# Patient Record
Sex: MALE | Race: WHITE | NOT HISPANIC OR LATINO
[De-identification: names, ages, dates, MRNs, and addresses within clinical notes are randomized per-mention and may not be internally consistent; named-entity substitution may affect disease eponyms.]

---

## 2019-06-17 ENCOUNTER — APPOINTMENT (OUTPATIENT)
Dept: UROLOGY | Facility: CLINIC | Age: 69
End: 2019-06-17
Payer: MEDICARE

## 2019-06-17 VITALS
DIASTOLIC BLOOD PRESSURE: 83 MMHG | WEIGHT: 185 LBS | SYSTOLIC BLOOD PRESSURE: 185 MMHG | BODY MASS INDEX: 27.4 KG/M2 | HEIGHT: 69 IN | TEMPERATURE: 98.5 F

## 2019-06-17 DIAGNOSIS — Z80.1 FAMILY HISTORY OF MALIGNANT NEOPLASM OF TRACHEA, BRONCHUS AND LUNG: ICD-10-CM

## 2019-06-17 DIAGNOSIS — Z00.00 ENCOUNTER FOR GENERAL ADULT MEDICAL EXAMINATION W/OUT ABNORMAL FINDINGS: ICD-10-CM

## 2019-06-17 PROCEDURE — 99204 OFFICE O/P NEW MOD 45 MIN: CPT

## 2019-06-17 NOTE — REVIEW OF SYSTEMS
[Chest Pain] : chest pain [Shortness Of Breath] : shortness of breath [Poor quality erections] : Poor quality erections [Told you have blood in urine on a urine test] : told blood was present in a urine test [Wake up at night to urinate  How many times?  ___] : wakes up to urinate [unfilled] times during the night [Slow urine stream] : slow urine stream [Leakage of urine with urgency] : leakage of urine with urgency [Joint Swelling] : joint swelling [Anxiety] : anxiety [Depression] : depression [Negative] : Heme/Lymph

## 2019-06-18 ENCOUNTER — CLINICAL ADVICE (OUTPATIENT)
Age: 69
End: 2019-06-18

## 2019-06-18 NOTE — LETTER BODY
[FreeTextEntry1] : Rick Pearson MD\par [address] [place] [address] [address]\[place] [address]\par \par Dear Dr. Pearson,\par \par Reason for Visit: Elevated PSA.\par \par This is a 69 year-old recently retired gentleman with elevated PSA. Patient reports that he has not had previous prostate biopsy. Patient recently obtained blood test from PCP demonstrated abnormal PSA. Patient denies any hematuria or lower urinary tract symptoms. He denies any pain.The patient denies any aggravating or relieving factors. The patient denies any interference of function. The patient is entirely asymptomatic. All other review of systems are negative. He has family history of cervical and lung cancer. He has no previous surgical history. Past medical history, family history and social history were inquired and were noncontributory to current condition. The patient does not use tobacco or drink alcohol. Medications and allergies were reviewed. He has no known allergies to medication. \par \par On examination, the patient is a healthy-appearing gentleman in no acute distress. He is alert and oriented follows commands. He has normal mood and affect. He is normocephalic. Neck is supple. Oral no thrush. Respirations are unlabored. His abdomen is soft and nontender. Bladder is nonpalpable. No CVA tenderness. Neurologically he is grossly intact. No peripheral edema. Skin without gross abnormality. He has normal male external genitalia. Normal meatus. Bilateral testes are descended intrascrotally and normal to palpation. On rectal examination, there is normal sphincter tone. The prostate is clinically benign without focal induration or nodularity.\par \par Assessment: Elevated PSA.\par \par I discussed with him the risk of occult malignancy. He will repeat PSA and obtain BMP, urinalysis and culture today. Patient currently declines prostate biopsy. He is concerned about the risk following prostate biopsy. Risks and alternatives were discussed. I answered the patient questions. The patient will follow-up as directed and will contact me with any questions or concerns. Thank you for the opportunity to participate in the care of Mr. TADEO. I will keep you updated on his progress.\par \par Plan: Repeat PSA. BMP. Urinalysis. Culture. Follow up as directed.\par \par PSA 4.3. Prostate biopsy

## 2019-06-18 NOTE — ADDENDUM
[FreeTextEntry1] : Entered by Leslie Moore, acting as scribe for Dr. Gilbert Cerda.\par \par The documentation recorded by the scribe accurately reflects the service I personally performed and the decisions made by me.

## 2019-06-19 LAB
ANION GAP SERPL CALC-SCNC: 17 MMOL/L
APPEARANCE: CLEAR
BACTERIA UR CULT: NORMAL
BACTERIA: NEGATIVE
BILIRUBIN URINE: NEGATIVE
BLOOD URINE: NEGATIVE
BUN SERPL-MCNC: 19 MG/DL
CALCIUM SERPL-MCNC: 9.6 MG/DL
CHLORIDE SERPL-SCNC: 106 MMOL/L
CO2 SERPL-SCNC: 22 MMOL/L
COLOR: NORMAL
CREAT SERPL-MCNC: 0.85 MG/DL
GLUCOSE QUALITATIVE U: NEGATIVE
GLUCOSE SERPL-MCNC: 86 MG/DL
HYALINE CASTS: 0 /LPF
KETONES URINE: NEGATIVE
LEUKOCYTE ESTERASE URINE: NEGATIVE
MICROSCOPIC-UA: NORMAL
NITRITE URINE: NEGATIVE
PH URINE: 7
POTASSIUM SERPL-SCNC: 5 MMOL/L
PROTEIN URINE: NEGATIVE
PSA FREE FLD-MCNC: 15 %
PSA FREE SERPL-MCNC: 0.64 NG/ML
PSA SERPL-MCNC: 4.3 NG/ML
RED BLOOD CELLS URINE: 3 /HPF
SODIUM SERPL-SCNC: 145 MMOL/L
SPECIFIC GRAVITY URINE: 1.02
SQUAMOUS EPITHELIAL CELLS: 0 /HPF
UROBILINOGEN URINE: NORMAL
WHITE BLOOD CELLS URINE: 0 /HPF

## 2019-06-24 ENCOUNTER — NON-APPOINTMENT (OUTPATIENT)
Age: 69
End: 2019-06-24

## 2019-06-24 ENCOUNTER — APPOINTMENT (OUTPATIENT)
Dept: CARDIOLOGY | Facility: CLINIC | Age: 69
End: 2019-06-24
Payer: MEDICARE

## 2019-06-24 VITALS
WEIGHT: 181 LBS | HEART RATE: 66 BPM | BODY MASS INDEX: 26.81 KG/M2 | SYSTOLIC BLOOD PRESSURE: 165 MMHG | HEIGHT: 69 IN | RESPIRATION RATE: 15 BRPM | DIASTOLIC BLOOD PRESSURE: 95 MMHG

## 2019-06-24 DIAGNOSIS — Z86.59 PERSONAL HISTORY OF OTHER MENTAL AND BEHAVIORAL DISORDERS: ICD-10-CM

## 2019-06-24 PROCEDURE — 93000 ELECTROCARDIOGRAM COMPLETE: CPT

## 2019-06-24 PROCEDURE — 99204 OFFICE O/P NEW MOD 45 MIN: CPT

## 2019-06-24 NOTE — DISCUSSION/SUMMARY
[FreeTextEntry1] : This is a 69-year-old male with past medical history significant for anxiety, hypertension, status post bilateral hernia repair, status post bilateral varicose vein surgery, who comes in for cardiac consultation.  The patient reports his blood pressure has been elevated and unable to be controlled.  He attributes part of this to a anxiety component.\par He denies chest pain, shortness breath, dizziness or syncope.  He has no history of rheumatic fever.  He does not drink excessive caffeine or alcohol.  He has no known allergies.\par Electrocardiogram done June 24, 2019 demonstrates normal sinus rhythm rate 63 beats per minute and is otherwise remarkable for left axis deviation and evidence for left ventricular hypertrophy.\par The patient will schedule echo Doppler examination to evaluate his murmur, left ventricular function, chamber size, and rule out hypertrophy.\par He will follow up with me one month to recheck his blood pressure.  Lifestyle modification including salt restriction, regular aerobic activity, will reinforce.  He is currently taking Bystolic 20mg at midnight.  I have asked him to cut this medication in half, and take Micardis hydrochlorothiazide 80/25 mg in the morning and continue his 10 mg dose of Bystolic at midnight.\par The patient understands that aerobic exercises must be increased to 40 minutes 4 times per week. A detailed discussion of lifestyle modification was done today. The patient has a good understanding of the diagnosis, and treatment plan. Lifestyle modification was also outlined.

## 2019-06-24 NOTE — PHYSICAL EXAM
[General Appearance - Well Developed] : well developed [Normal Appearance] : normal appearance [Well Groomed] : well groomed [General Appearance - Well Nourished] : well nourished [No Deformities] : no deformities [General Appearance - In No Acute Distress] : no acute distress [Normal Conjunctiva] : the conjunctiva exhibited no abnormalities [Normal Oral Mucosa] : normal oral mucosa [Normal Jugular Venous A Waves Present] : normal jugular venous A waves present [Normal Jugular Venous V Waves Present] : normal jugular venous V waves present [No Jugular Venous Farris A Waves] : no jugular venous farris A waves [5th Left ICS - MCL] : palpated at the 5th LICS in the midclavicular line [Normal] : normal [No Precordial Heave] : no precordial heave was noted [Normal Rate] : normal [Rhythm Regular] : regular [Normal S1] : normal S1 [Normal S2] : normal S2 [No Gallop] : no gallop heard [2+] : left 2+ [No Abnormalities] : the abdominal aorta was not enlarged and no bruit was heard [No Pitting Edema] : no pitting edema present [Respiration, Rhythm And Depth] : normal respiratory rhythm and effort [Bowel Sounds] : normal bowel sounds [Auscultation Breath Sounds / Voice Sounds] : lungs were clear to auscultation bilaterally [Exaggerated Use Of Accessory Muscles For Inspiration] : no accessory muscle use [Gait - Sufficient For Exercise Testing] : the gait was sufficient for exercise testing [Abnormal Walk] : normal gait [Abdomen Soft] : soft [Nail Clubbing] : no clubbing of the fingernails [Skin Color & Pigmentation] : normal skin color and pigmentation [Cyanosis, Localized] : no localized cyanosis [Skin Turgor] : normal skin turgor [] : no rash [Oriented To Time, Place, And Person] : oriented to person, place, and time [Affect] : the affect was normal [No Anxiety] : not feeling anxious [Mood] : the mood was normal [II] : a grade 2 [S3] : no S3 [S4] : no S4 [Left Carotid Bruit] : no bruit heard over the left carotid [Right Carotid Bruit] : no bruit heard over the right carotid [Right Femoral Bruit] : no bruit heard over the right femoral artery [Left Femoral Bruit] : no bruit heard over the left femoral artery

## 2019-06-24 NOTE — REVIEW OF SYSTEMS
[Negative] : Heme/Lymph [Confusion] : no confusion was observed [Depression] : no depression [Memory Lapses Or Loss] : no memory lapses or loss [Anxiety] : anxiety [Suicidal] : not suicidal

## 2019-06-26 RX ORDER — ALPRAZOLAM 0.25 MG/1
0.25 TABLET ORAL
Refills: 0 | Status: DISCONTINUED | COMMUNITY
End: 2019-06-26

## 2019-06-27 ENCOUNTER — APPOINTMENT (OUTPATIENT)
Dept: UROLOGY | Facility: CLINIC | Age: 69
End: 2019-06-27
Payer: MEDICARE

## 2019-06-27 DIAGNOSIS — R97.20 ELEVATED PROSTATE, SPECIFIC ANTIGEN [PSA]: ICD-10-CM

## 2019-06-27 PROCEDURE — 99214 OFFICE O/P EST MOD 30 MIN: CPT

## 2019-06-27 NOTE — LETTER BODY
[FreeTextEntry1] : Rick Pearson MD\par [address] [place] [address] [address]\[place] [address]\par \par Dear Dr. Pearson,\par \par Reason for Visit: Elevated PSA.\par \par This is a 69 year-old recently retired gentleman with elevated PSA. Patient reports that he has not had previous prostate biopsy. Patient recently obtained blood test from PCP demonstrated abnormal PSA. Patient returns today for follow up. Since his last visit, his PSA remains elevated. Patient denies any hematuria or lower urinary tract symptoms. He denies any pain.The patient denies any aggravating or relieving factors. The patient denies any interference of function. The patient is entirely asymptomatic. All other review of systems are negative. He has family history of cervical and lung cancer. He has no previous surgical history. Past medical history, family history and social history were inquired and were noncontributory to current condition. The patient does not use tobacco or drink alcohol. Medications and allergies were reviewed. He has no known allergies to medication. \par \par On examination, the patient is a healthy-appearing gentleman in no acute distress. He is alert and oriented follows commands. He has normal mood and affect. He is normocephalic. Neck is supple. Oral no thrush. Respirations are unlabored. His abdomen is soft and nontender. Bladder is nonpalpable. No CVA tenderness. Neurologically he is grossly intact. No peripheral edema. Skin without gross abnormality. He has normal male external genitalia. Normal meatus. Bilateral testes are descended intrascrotally and normal to palpation. On rectal examination, there is normal sphincter tone. The prostate is clinically benign without focal induration or nodularity.\par \par His BMP demonstrated normal renal functions, creatinine 0.85. His PSA was 4.3, which is elevated. His urinalysis was unremarkable. His urine culture was negative. \par \par Assessment: Elevated PSA.\par \par I discussed with him the risk of occult malignancy. His PSA remains elevated. He will obtain prostate biopsy to further evaluate.  I counseled the patient regarding the procedure. The risks and benefits were discussed. Alternatives were given. I answered the patient questions. The patient will take the necessary preparations for the procedure. The patient will follow-up as directed and will contact me with any questions or concerns. Thank you for the opportunity to participate in the care of Mr. TADEO. I will keep you updated on his progress.\par \par Plan: Prostate biopsy. Follow up as directed.

## 2019-07-01 ENCOUNTER — APPOINTMENT (OUTPATIENT)
Dept: UROLOGY | Facility: CLINIC | Age: 69
End: 2019-07-01

## 2019-08-07 ENCOUNTER — APPOINTMENT (OUTPATIENT)
Dept: CARDIOLOGY | Facility: CLINIC | Age: 69
End: 2019-08-07
Payer: MEDICARE

## 2019-08-07 VITALS
SYSTOLIC BLOOD PRESSURE: 127 MMHG | RESPIRATION RATE: 16 BRPM | WEIGHT: 180 LBS | DIASTOLIC BLOOD PRESSURE: 80 MMHG | HEIGHT: 69 IN | BODY MASS INDEX: 26.66 KG/M2 | HEART RATE: 66 BPM

## 2019-08-07 PROCEDURE — 93306 TTE W/DOPPLER COMPLETE: CPT

## 2019-08-07 PROCEDURE — 99213 OFFICE O/P EST LOW 20 MIN: CPT | Mod: 25

## 2019-08-07 PROCEDURE — 93015 CV STRESS TEST SUPVJ I&R: CPT

## 2019-08-12 RX ORDER — NEBIVOLOL HYDROCHLORIDE 20 MG/1
20 TABLET ORAL
Qty: 90 | Refills: 1 | Status: DISCONTINUED | COMMUNITY
End: 2019-08-12

## 2019-09-10 ENCOUNTER — NON-APPOINTMENT (OUTPATIENT)
Age: 69
End: 2019-09-10

## 2019-09-10 ENCOUNTER — APPOINTMENT (OUTPATIENT)
Dept: CARDIOLOGY | Facility: CLINIC | Age: 69
End: 2019-09-10
Payer: MEDICARE

## 2019-09-10 VITALS
RESPIRATION RATE: 15 BRPM | WEIGHT: 183 LBS | BODY MASS INDEX: 27.11 KG/M2 | HEART RATE: 70 BPM | DIASTOLIC BLOOD PRESSURE: 80 MMHG | HEIGHT: 69 IN | SYSTOLIC BLOOD PRESSURE: 138 MMHG

## 2019-09-10 PROCEDURE — 99213 OFFICE O/P EST LOW 20 MIN: CPT

## 2019-09-10 PROCEDURE — 93000 ELECTROCARDIOGRAM COMPLETE: CPT

## 2019-09-11 RX ORDER — CEFDINIR 300 MG/1
300 CAPSULE ORAL
Qty: 6 | Refills: 0 | Status: COMPLETED | COMMUNITY
Start: 2019-06-18 | End: 2019-09-11

## 2019-09-23 RX ORDER — LABETALOL HYDROCHLORIDE 200 MG/1
200 TABLET, FILM COATED ORAL
Qty: 180 | Refills: 1 | Status: DISCONTINUED | COMMUNITY
Start: 2019-08-12 | End: 2019-09-23

## 2019-10-01 ENCOUNTER — APPOINTMENT (OUTPATIENT)
Dept: CARDIOLOGY | Facility: CLINIC | Age: 69
End: 2019-10-01
Payer: MEDICARE

## 2019-10-01 VITALS — HEIGHT: 69 IN | WEIGHT: 183 LBS | BODY MASS INDEX: 27.11 KG/M2

## 2019-10-01 VITALS
HEART RATE: 70 BPM | HEIGHT: 69 IN | SYSTOLIC BLOOD PRESSURE: 132 MMHG | WEIGHT: 183 LBS | BODY MASS INDEX: 27.11 KG/M2 | DIASTOLIC BLOOD PRESSURE: 84 MMHG | RESPIRATION RATE: 16 BRPM

## 2019-10-01 PROCEDURE — 99213 OFFICE O/P EST LOW 20 MIN: CPT

## 2019-10-04 RX ORDER — ENEMA 19; 7 G/133ML; G/133ML
7-19 ENEMA RECTAL
Qty: 1 | Refills: 0 | Status: COMPLETED | COMMUNITY
Start: 2019-06-18 | End: 2019-10-04

## 2019-11-18 ENCOUNTER — APPOINTMENT (OUTPATIENT)
Dept: CARDIOLOGY | Facility: CLINIC | Age: 69
End: 2019-11-18
Payer: MEDICARE

## 2019-11-18 VITALS
RESPIRATION RATE: 16 BRPM | BODY MASS INDEX: 26.81 KG/M2 | DIASTOLIC BLOOD PRESSURE: 82 MMHG | SYSTOLIC BLOOD PRESSURE: 129 MMHG | WEIGHT: 181 LBS | HEIGHT: 69 IN | HEART RATE: 79 BPM

## 2019-11-18 PROCEDURE — 99213 OFFICE O/P EST LOW 20 MIN: CPT

## 2019-12-09 ENCOUNTER — APPOINTMENT (OUTPATIENT)
Dept: PULMONOLOGY | Facility: CLINIC | Age: 69
End: 2019-12-09
Payer: MEDICARE

## 2019-12-09 ENCOUNTER — NON-APPOINTMENT (OUTPATIENT)
Age: 69
End: 2019-12-09

## 2019-12-09 VITALS
WEIGHT: 181 LBS | HEART RATE: 75 BPM | RESPIRATION RATE: 17 BRPM | OXYGEN SATURATION: 98 % | SYSTOLIC BLOOD PRESSURE: 110 MMHG | BODY MASS INDEX: 26.81 KG/M2 | DIASTOLIC BLOOD PRESSURE: 70 MMHG | HEIGHT: 69 IN

## 2019-12-09 DIAGNOSIS — Z82.5 FAMILY HISTORY OF ASTHMA AND OTHER CHRONIC LOWER RESPIRATORY DISEASES: ICD-10-CM

## 2019-12-09 DIAGNOSIS — Z82.49 FAMILY HISTORY OF ISCHEMIC HEART DISEASE AND OTHER DISEASES OF THE CIRCULATORY SYSTEM: ICD-10-CM

## 2019-12-09 PROCEDURE — 71046 X-RAY EXAM CHEST 2 VIEWS: CPT

## 2019-12-09 PROCEDURE — 99204 OFFICE O/P NEW MOD 45 MIN: CPT | Mod: 25

## 2019-12-09 PROCEDURE — 94618 PULMONARY STRESS TESTING: CPT

## 2019-12-09 PROCEDURE — 94060 EVALUATION OF WHEEZING: CPT

## 2019-12-09 NOTE — ASSESSMENT
[FreeTextEntry1] : Mr. TADEO is a 69 year old male with a history of elevated PSA, HTN, elevated HLD, mitral valve prolapse, occupational exposure in the workplace (9/11) non-smoker who now comes to the office for an initial pulmonary\par evaluation - specially SOB. \par \par His shortness of breath is multifactorial due to:\par -poor mechanics of breathing\par -out of shape / overweight\par -pulmonary disease\par ------- ? Asthma, r/o MEET\par -cardiac disease\par \par problem 1:  cardiac disease\par -recommended to continue to follow up with Cardiologist (Dr. Rick Pearson) \par \par problem 2 : poor breathing mechanics\par -Proper breathing techniques were reviewed with an emphasis of exhalation. Patient instructed to breath in for\par 1 second and out for four seconds. Patient was encouraged to not talk while walking.\par \par problem 3 : out of shape / overweight\par -Weight loss, exercise, and diet control were discussed and are highly encouraged. Treatment options were\par given such as, aqua therapy, and contacting a nutritionist. Recommended to use the elliptical, stationary bike,\par less use of treadmill. Mindful eating was explained to the patient Obesity is associated with worsening asthma,\par shortness of breath, and potential for cardiac disease, diabetes, and other underlying medical conditions\par \par Problem 4: R/o Asthma \par - Does not qualify for a methacholine challenge based on restrictive dysfunction - abnormal PFTs\par -add Trelegy 1 puff QD (likely asthma) \par Asthma is believed to be caused by inherited (genetic) and environmental factor, but its exact cause is unknown. Asthma may be triggered by allergens, lung infections, or irritants in the air. Asthma triggers are different for each person\par \par \par Problem 5 Occupational exposure in the workplace s/p 9/11\par - Regular follow-up going forward \par \par Problem 6: R/o MEET \par - Recommend Home sleep study \par --get blood work to include : free and total testosterone, Iron studies, Ferritin level, &amp; complete thyroid function testing.\par \par Sleep apnea is associated with adverse clinical consequences which can affect most organ systems.\par Cardiovascular disease risk includes arrhythmias, atrial fibrillation, hypertension, coronary artery disease,\par and stroke. Metabolic disorders include diabetes type 2, non-alcoholic fatty liver disease. Mood disorder\par especially depression; and cognitive decline especially in the elderly. Associations with chronic\par reflux/Rodriguez’s esophagus some but not all inclusive.\par -Reasons include arousal consistent with hypopnea; respiratory events most prominent in REM sleep or\par supine position; therefore sleep staging and body position are important for accurate diagnosis and\par estimation of AHI.\par \par \par \par problem 7 : health maintenance\par -recommended yearly flu shot after October 15 - s/p 2019 \par -recommended strep pneumonia vaccines: Prevnar-13 vaccine, followed by Pneumo vaccine 23 one year\par following -s/p 2019 \par -recommended early intervention for Upper Respiratory Infections (URIs)\par -recommended regular osteoporosis evaluations\par -recommended early dermatological evaluations\par -recommended after the age of 50 to the age of 70, colonoscopy every 5 years\par \par F/U in 6-8 weeks.\par He is encouraged to call with any changes, concerns, or questions\par

## 2019-12-09 NOTE — REASON FOR VISIT
[Initial Evaluation] : an initial evaluation [FreeTextEntry1] : occupational exposure in the workplace (9/11) and SOB

## 2019-12-09 NOTE — ADDENDUM
[FreeTextEntry1] : Documented by Pamela Montemayor acting as a scribe for Dr. Dexter Duran on 12/09/2019 \par \par All medical record entries made by the Scribe were at my, Dr. Dexter Duran's, direction and personally dictated by me on 12/09/2019 . I have reviewed the chart and agree that the record accurately reflects my personal performance of the history, physical exam, assessment and plan. I have also personally directed, reviewed, and agree with the discharge instructions.\par

## 2019-12-09 NOTE — HISTORY OF PRESENT ILLNESS
[FreeTextEntry1] : Mr. TADEO is a 69 year old male with a history of  elevated PSA, HTN, elevated HLD, mitral valve prolapse, occupational exposure in the workplace (9/11) non-smoker who now comes to the office for an initial pulmonaryevaluation.\par \par - Patient is here for occupational exposure in the workplace\par - He states he was working there at the site for the entire period there from [id number] \par - He c/o of poor stamina, gets winded easily\par - he c/o of SOB for about 10 years especially when climbing stairs and hills \par - He c/o of pain in his right shoulder and spreads through his back for about the past year \par - He can easily fall asleep while watching TV \par - He can easily fall asleep as a passenger in the car\par - He knows he snores at night. His neck size is 16 \par - He denies memory and concentration problems \par - He believes his weight is stable \par - He c/o of LE Edema \par - He denies muscle cramps \par - He needs an eye check-up \par - He states he has nocturia \par - He only sleeps about 4 hours a night and does not get enough sleep \par - He denies hoarseness and coughing \par - He states vicks makes his sinuses better\par - He has a deviated septum in his right nostril and had nosebleeds in the past \par - He denies leaky and drippy sinuses \par \par denies any headaches, nausea, vomiting, fever, chills, sweats, chest pain, chest pressure, diarrhea, constipation, dysphagia, dizziness, leg pain, itchy eyes, itchy ears, heartburn, reflux, or sour taste in the mouth.\par

## 2019-12-09 NOTE — PROCEDURE
[FreeTextEntry1] : CXR reveals a normal sized heart; no evidence of infiltrate or effusion--a normal appearing chest radiograph- mamillated right hemidiaphragm \par \par \par 6 minute walk test reveals a low saturation of 98% with no evidence of dyspnea or fatigue; walked 391.2 meters\par \par \par PFT revealed mild restrictive, mild obstructive dysfunction with a FEV1 of  2.26L, which is 71% of predicted, with a normal flow volume loop. There is 13% improvement with the BD. \par \par \par \par

## 2019-12-09 NOTE — PHYSICAL EXAM
[Normal Appearance] : normal appearance [General Appearance - Well Developed] : well developed [General Appearance - Well Nourished] : well nourished [Well Groomed] : well groomed [No Deformities] : no deformities [General Appearance - In No Acute Distress] : no acute distress [Normal Conjunctiva] : the conjunctiva exhibited no abnormalities [Normal Oropharynx] : normal oropharynx [Eyelids - No Xanthelasma] : the eyelids demonstrated no xanthelasmas [Jugular Venous Distention Increased] : there was no jugular-venous distention [Neck Cervical Mass (___cm)] : no neck mass was observed [Neck Appearance] : the appearance of the neck was normal [Thyroid Diffuse Enlargement] : the thyroid was not enlarged [Heart Rate And Rhythm] : heart rate and rhythm were normal [Thyroid Nodule] : there were no palpable thyroid nodules [Murmurs] : no murmurs present [Heart Sounds] : normal S1 and S2 [Respiration, Rhythm And Depth] : normal respiratory rhythm and effort [Exaggerated Use Of Accessory Muscles For Inspiration] : no accessory muscle use [Auscultation Breath Sounds / Voice Sounds] : lungs were clear to auscultation bilaterally [Abdomen Soft] : soft [Abdomen Tenderness] : non-tender [Abdomen Mass (___ Cm)] : no abdominal mass palpated [Gait - Sufficient For Exercise Testing] : the gait was sufficient for exercise testing [Abnormal Walk] : normal gait [Cyanosis, Localized] : no localized cyanosis [Nail Clubbing] : no clubbing of the fingernails [Petechial Hemorrhages (___cm)] : no petechial hemorrhages [Skin Turgor] : normal skin turgor [] : no rash [Skin Color & Pigmentation] : normal skin color and pigmentation [Deep Tendon Reflexes (DTR)] : deep tendon reflexes were 2+ and symmetric [Sensation] : the sensory exam was normal to light touch and pinprick [No Focal Deficits] : no focal deficits [Oriented To Time, Place, And Person] : oriented to person, place, and time [Impaired Insight] : insight and judgment were intact [Affect] : the affect was normal [II] : II [FreeTextEntry1] : I:E ratio 1:3; clear

## 2019-12-10 LAB
T3FREE SERPL-MCNC: 3.47 PG/ML
T4 FREE SERPL-MCNC: 0.9 NG/DL
TSH SERPL-ACNC: 1.18 UIU/ML

## 2019-12-16 LAB
TESTOST BND SERPL-MCNC: 11.4 PG/ML
TESTOST SERPL-MCNC: 426.1 NG/DL

## 2019-12-18 ENCOUNTER — APPOINTMENT (OUTPATIENT)
Dept: CARDIOLOGY | Facility: CLINIC | Age: 69
End: 2019-12-18

## 2020-01-24 ENCOUNTER — APPOINTMENT (OUTPATIENT)
Dept: SLEEP CENTER | Facility: CLINIC | Age: 70
End: 2020-01-24
Payer: MEDICARE

## 2020-01-24 PROCEDURE — ZZZZZ: CPT

## 2020-02-03 ENCOUNTER — APPOINTMENT (OUTPATIENT)
Dept: SLEEP CENTER | Facility: CLINIC | Age: 70
End: 2020-02-03
Payer: MEDICARE

## 2020-02-03 ENCOUNTER — APPOINTMENT (OUTPATIENT)
Dept: PULMONOLOGY | Facility: CLINIC | Age: 70
End: 2020-02-03
Payer: MEDICARE

## 2020-02-03 ENCOUNTER — OUTPATIENT (OUTPATIENT)
Dept: OUTPATIENT SERVICES | Facility: HOSPITAL | Age: 70
LOS: 1 days | End: 2020-02-03
Payer: MEDICARE

## 2020-02-03 ENCOUNTER — NON-APPOINTMENT (OUTPATIENT)
Age: 70
End: 2020-02-03

## 2020-02-03 ENCOUNTER — LABORATORY RESULT (OUTPATIENT)
Age: 70
End: 2020-02-03

## 2020-02-03 VITALS
BODY MASS INDEX: 27.89 KG/M2 | HEIGHT: 68 IN | OXYGEN SATURATION: 98 % | SYSTOLIC BLOOD PRESSURE: 140 MMHG | HEART RATE: 78 BPM | WEIGHT: 184 LBS | RESPIRATION RATE: 17 BRPM | DIASTOLIC BLOOD PRESSURE: 70 MMHG

## 2020-02-03 PROCEDURE — 95012 NITRIC OXIDE EXP GAS DETER: CPT

## 2020-02-03 PROCEDURE — 95806 SLEEP STUDY UNATT&RESP EFFT: CPT

## 2020-02-03 PROCEDURE — 95806 SLEEP STUDY UNATT&RESP EFFT: CPT | Mod: 26

## 2020-02-03 PROCEDURE — 99214 OFFICE O/P EST MOD 30 MIN: CPT | Mod: 25

## 2020-02-03 PROCEDURE — 94010 BREATHING CAPACITY TEST: CPT

## 2020-02-03 NOTE — REVIEW OF SYSTEMS
[Negative] : Endocrine [Cough] : cough [Dyspnea] : dyspnea [SOB on Exertion] : sob on exertion [Chest Discomfort] : chest discomfort [Arthralgias] : arthralgias [Myalgias] : myalgias [Wheezing] : no wheezing [GERD] : no gerd [Diarrhea] : no diarrhea [Constipation] : no constipation [Dysphagia] : no dysphagia

## 2020-02-03 NOTE — PHYSICAL EXAM
[General Appearance - Well Developed] : well developed [Normal Appearance] : normal appearance [Well Groomed] : well groomed [General Appearance - Well Nourished] : well nourished [No Deformities] : no deformities [General Appearance - In No Acute Distress] : no acute distress [Normal Conjunctiva] : the conjunctiva exhibited no abnormalities [Eyelids - No Xanthelasma] : the eyelids demonstrated no xanthelasmas [Normal Oropharynx] : normal oropharynx [II] : II [Neck Appearance] : the appearance of the neck was normal [Neck Cervical Mass (___cm)] : no neck mass was observed [Jugular Venous Distention Increased] : there was no jugular-venous distention [Thyroid Diffuse Enlargement] : the thyroid was not enlarged [Thyroid Nodule] : there were no palpable thyroid nodules [Heart Rate And Rhythm] : heart rate and rhythm were normal [Heart Sounds] : normal S1 and S2 [Murmurs] : no murmurs present [Respiration, Rhythm And Depth] : normal respiratory rhythm and effort [Exaggerated Use Of Accessory Muscles For Inspiration] : no accessory muscle use [Auscultation Breath Sounds / Voice Sounds] : lungs were clear to auscultation bilaterally [Abdomen Soft] : soft [Abdomen Tenderness] : non-tender [Abdomen Mass (___ Cm)] : no abdominal mass palpated [Abnormal Walk] : normal gait [Gait - Sufficient For Exercise Testing] : the gait was sufficient for exercise testing [Nail Clubbing] : no clubbing of the fingernails [Cyanosis, Localized] : no localized cyanosis [Petechial Hemorrhages (___cm)] : no petechial hemorrhages [Skin Color & Pigmentation] : normal skin color and pigmentation [Skin Turgor] : normal skin turgor [] : no rash [Deep Tendon Reflexes (DTR)] : deep tendon reflexes were 2+ and symmetric [Sensation] : the sensory exam was normal to light touch and pinprick [No Focal Deficits] : no focal deficits [Oriented To Time, Place, And Person] : oriented to person, place, and time [Impaired Insight] : insight and judgment were intact [Affect] : the affect was normal [FreeTextEntry1] : I:E ratio 1:3; clear

## 2020-02-03 NOTE — PROCEDURE
[FreeTextEntry1] : PFT revealed mild restrictive dysfunction, with a FEV1 of 3.25L, which is 76% of predicted, with a normal flow volume loop \par \par FENO was 19; a normal value being less than 25\par Fractional exhaled nitric oxide (FENO) is regarded as a simple, noninvasive method for assessing eosinophilic airway inflammation. Produced by a variety of cells within the lung, nitric oxide (NO) concentrations are generally low in healthy individuals. However, high concentrations of NO appear to be involved in nonspecific host defense mechanisms and chronic inflammatory diseases such as asthma. The American Thor[employer] Society (ATS) therefore has recommended using FENO to aid in the diagnosis and monitoring of eosinophilic airway inflammation and asthma, and for identifying steroid responsive individuals whose chronic respiratory symptoms may be caused by airway inflammation.

## 2020-02-03 NOTE — REASON FOR VISIT
[Follow-Up] : a follow-up visit [FreeTextEntry1] : occupational exposure in the workplace (9/11) and SOB, poor sleep / ?OSAS

## 2020-02-03 NOTE — ADDENDUM
[FreeTextEntry1] : Documented by Melvin Islas acting as a scribe for Dr. Dexter Duran on 02/03/2020.\par \par All medical record entries made by the Scribe were at my, Dr. Dexter Duran's, direction and personally dictated by me on 02/03/2020. I have reviewed the chart and agree that the record accurately reflects my personal performance of the history, physical exam, assessment and plan. I have also personally directed, reviewed, and agree with the discharge instructions.

## 2020-02-03 NOTE — ASSESSMENT
[FreeTextEntry1] : Mr. TADEO is a 69 year old male with a history of elevated PSA, HTN, elevated HLD, mitral valve prolapse, occupational exposure in the workplace (9/11) non-smoker who now comes to the office for a follow up pulmonary evaluation - specifically for SOB / cough / ?OSAS\par \par His shortness of breath is multifactorial due to:\par -poor mechanics of breathing\par -out of shape / overweight\par -pulmonary disease\par ------- ? Asthma, r/o MEET\par -cardiac disease\par \par problem 1:  cardiac disease\par -recommended to continue to follow up with Cardiologist (Dr. Rick Pearson) \par \par problem 2 : poor breathing mechanics\par -Proper breathing techniques were reviewed with an emphasis of exhalation. Patient instructed to breath in for\par 1 second and out for four seconds. Patient was encouraged to not talk while walking.\par \par problem 3 : out of shape / overweight\par -Weight loss, exercise, and diet control were discussed and are highly encouraged. Treatment options were\par given such as, aqua therapy, and contacting a nutritionist. Recommended to use the elliptical, stationary bike,\par less use of treadmill. Mindful eating was explained to the patient Obesity is associated with worsening asthma,\par shortness of breath, and potential for cardiac disease, diabetes, and other underlying medical conditions\par \par Problem 4: R/o Asthma \par - Does not qualify for a methacholine challenge based on restrictive dysfunction - abnormal PFTs\par -continue Trelegy 1 puff QD (likely asthma) \par -Complete blood work to include: IgE level, eosinophil level, vitamin D level, food IgE level, and asthma profile \par \par Asthma is believed to be caused by inherited (genetic) and environmental factor, but its exact cause is unknown. Asthma may be triggered by allergens, lung infections, or irritants in the air. Asthma triggers are different for each person\par \par Problem 5 Occupational exposure in the workplace s/p 9/11\par - Regular follow-up going forward \par \par Problem 6: R/o MEET \par - Recommend Home sleep study (repeat today 2/3/2020)\par -s/p blood work to include : free and total testosterone, Iron studies, Ferritin level, &amp; complete thyroid function testing. (all negative)\par \par Sleep apnea is associated with adverse clinical consequences which can affect most organ systems.\par Cardiovascular disease risk includes arrhythmias, atrial fibrillation, hypertension, coronary artery disease,\par and stroke. Metabolic disorders include diabetes type 2, non-alcoholic fatty liver disease. Mood disorder\par especially depression; and cognitive decline especially in the elderly. Associations with chronic\par reflux/Rodriguez’s esophagus some but not all inclusive.\par -Reasons include arousal consistent with hypopnea; respiratory events most prominent in REM sleep or\par supine position; therefore sleep staging and body position are important for accurate diagnosis and\par estimation of AHI.\par \par \par \par problem 7 : health maintenance\par -recommended yearly flu shot after October 15 - s/p 2019 \par -recommended strep pneumonia vaccines: Prevnar-13 vaccine, followed by Pneumo vaccine 23 one year\par following -s/p 2019 \par -recommended early intervention for Upper Respiratory Infections (URIs)\par -recommended regular osteoporosis evaluations\par -recommended early dermatological evaluations\par -recommended after the age of 50 to the age of 70, colonoscopy every 5 years\par \par F/U in 6-8 weeks.\par He is encouraged to call with any changes, concerns, or questions\par

## 2020-02-04 LAB
24R-OH-CALCIDIOL SERPL-MCNC: 55.1 PG/ML
25(OH)D3 SERPL-MCNC: 24.8 NG/ML
BASOPHILS # BLD AUTO: 0.02 K/UL
BASOPHILS NFR BLD AUTO: 0.3 %
EOSINOPHIL # BLD AUTO: 0.2 K/UL
EOSINOPHIL NFR BLD AUTO: 3 %
HCT VFR BLD CALC: 43.9 %
HGB BLD-MCNC: 14.9 G/DL
IMM GRANULOCYTES NFR BLD AUTO: 0.6 %
LYMPHOCYTES # BLD AUTO: 1.25 K/UL
LYMPHOCYTES NFR BLD AUTO: 18.8 %
MAN DIFF?: NORMAL
MCHC RBC-ENTMCNC: 32 PG
MCHC RBC-ENTMCNC: 33.9 GM/DL
MCV RBC AUTO: 94.4 FL
MONOCYTES # BLD AUTO: 0.55 K/UL
MONOCYTES NFR BLD AUTO: 8.3 %
NEUTROPHILS # BLD AUTO: 4.6 K/UL
NEUTROPHILS NFR BLD AUTO: 69 %
PLATELET # BLD AUTO: 262 K/UL
RBC # BLD: 4.65 M/UL
RBC # FLD: 12 %
WBC # FLD AUTO: 6.66 K/UL

## 2020-02-05 LAB — TOTAL IGE SMQN RAST: 26 KU/L

## 2020-02-06 LAB
A ALTERNATA IGE QN: <0.1 KUA/L
A FUMIGATUS IGE QN: <0.1 KUA/L
C ALBICANS IGE QN: <0.1 KUA/L
C HERBARUM IGE QN: <0.1 KUA/L
CAT DANDER IGE QN: <0.1 KUA/L
CLAM IGE QN: <0.1 KUA/L
CODFISH IGE QN: <0.1 KUA/L
COMMON RAGWEED IGE QN: 0.16 KUA/L
CORN IGE QN: <0.1 KUA/L
COW MILK IGE QN: <0.1 KUA/L
D FARINAE IGE QN: <0.1 KUA/L
D PTERONYSS IGE QN: <0.1 KUA/L
DEPRECATED A ALTERNATA IGE RAST QL: 0
DEPRECATED A FUMIGATUS IGE RAST QL: 0
DEPRECATED C ALBICANS IGE RAST QL: 0
DEPRECATED C HERBARUM IGE RAST QL: 0
DEPRECATED CAT DANDER IGE RAST QL: 0
DEPRECATED CLAM IGE RAST QL: 0
DEPRECATED CODFISH IGE RAST QL: 0
DEPRECATED COMMON RAGWEED IGE RAST QL: NORMAL
DEPRECATED CORN IGE RAST QL: 0
DEPRECATED COW MILK IGE RAST QL: 0
DEPRECATED D FARINAE IGE RAST QL: 0
DEPRECATED D PTERONYSS IGE RAST QL: 0
DEPRECATED DOG DANDER IGE RAST QL: 0
DEPRECATED EGG WHITE IGE RAST QL: 1
DEPRECATED M RACEMOSUS IGE RAST QL: 0
DEPRECATED PEANUT IGE RAST QL: 0
DEPRECATED ROACH IGE RAST QL: 0
DEPRECATED SCALLOP IGE RAST QL: <0.1 KUA/L
DEPRECATED SESAME SEED IGE RAST QL: 0
DEPRECATED SHRIMP IGE RAST QL: 0
DEPRECATED SOYBEAN IGE RAST QL: 0
DEPRECATED TIMOTHY IGE RAST QL: 0
DEPRECATED WALNUT IGE RAST QL: 0
DEPRECATED WHEAT IGE RAST QL: 0
DEPRECATED WHITE OAK IGE RAST QL: 0
DOG DANDER IGE QN: <0.1 KUA/L
EGG WHITE IGE QN: 0.57 KUA/L
M RACEMOSUS IGE QN: <0.1 KUA/L
PEANUT IGE QN: <0.1 KUA/L
ROACH IGE QN: <0.1 KUA/L
SCALLOP IGE QN: 0
SCALLOP IGE QN: <0.1 KUA/L
SESAME SEED IGE QN: <0.1 KUA/L
SOYBEAN IGE QN: <0.1 KUA/L
TIMOTHY IGE QN: <0.1 KUA/L
WALNUT IGE QN: <0.1 KUA/L
WHEAT IGE QN: <0.1 KUA/L
WHITE OAK IGE QN: <0.1 KUA/L

## 2020-02-11 DIAGNOSIS — G47.33 OBSTRUCTIVE SLEEP APNEA (ADULT) (PEDIATRIC): ICD-10-CM

## 2020-02-14 ENCOUNTER — APPOINTMENT (OUTPATIENT)
Dept: CARDIOLOGY | Facility: CLINIC | Age: 70
End: 2020-02-14
Payer: MEDICARE

## 2020-02-14 ENCOUNTER — NON-APPOINTMENT (OUTPATIENT)
Age: 70
End: 2020-02-14

## 2020-02-14 VITALS
WEIGHT: 183 LBS | HEART RATE: 69 BPM | HEIGHT: 68 IN | RESPIRATION RATE: 17 BRPM | BODY MASS INDEX: 27.74 KG/M2 | SYSTOLIC BLOOD PRESSURE: 143 MMHG | DIASTOLIC BLOOD PRESSURE: 84 MMHG

## 2020-02-14 DIAGNOSIS — Z78.9 OTHER SPECIFIED HEALTH STATUS: ICD-10-CM

## 2020-02-14 PROCEDURE — 93000 ELECTROCARDIOGRAM COMPLETE: CPT

## 2020-02-14 PROCEDURE — 99214 OFFICE O/P EST MOD 30 MIN: CPT

## 2020-02-14 RX ORDER — ROSUVASTATIN CALCIUM 10 MG/1
10 TABLET, FILM COATED ORAL
Qty: 90 | Refills: 1 | Status: DISCONTINUED | COMMUNITY
Start: 2019-08-07 | End: 2020-02-14

## 2020-02-24 ENCOUNTER — APPOINTMENT (OUTPATIENT)
Dept: CARDIOLOGY | Facility: CLINIC | Age: 70
End: 2020-02-24

## 2020-03-04 ENCOUNTER — APPOINTMENT (OUTPATIENT)
Dept: CARDIOLOGY | Facility: CLINIC | Age: 70
End: 2020-03-04
Payer: MEDICARE

## 2020-03-04 VITALS
DIASTOLIC BLOOD PRESSURE: 83 MMHG | HEIGHT: 68 IN | WEIGHT: 184 LBS | BODY MASS INDEX: 27.89 KG/M2 | RESPIRATION RATE: 15 BRPM | HEART RATE: 72 BPM | SYSTOLIC BLOOD PRESSURE: 130 MMHG

## 2020-03-04 PROCEDURE — 99213 OFFICE O/P EST LOW 20 MIN: CPT

## 2020-03-16 ENCOUNTER — APPOINTMENT (OUTPATIENT)
Dept: PULMONOLOGY | Facility: CLINIC | Age: 70
End: 2020-03-16
Payer: MEDICARE

## 2020-03-16 ENCOUNTER — NON-APPOINTMENT (OUTPATIENT)
Age: 70
End: 2020-03-16

## 2020-03-16 VITALS
WEIGHT: 185 LBS | OXYGEN SATURATION: 99 % | DIASTOLIC BLOOD PRESSURE: 80 MMHG | SYSTOLIC BLOOD PRESSURE: 130 MMHG | HEIGHT: 68 IN | HEART RATE: 77 BPM | BODY MASS INDEX: 28.04 KG/M2 | RESPIRATION RATE: 17 BRPM

## 2020-03-16 PROCEDURE — 94010 BREATHING CAPACITY TEST: CPT

## 2020-03-16 PROCEDURE — 99214 OFFICE O/P EST MOD 30 MIN: CPT | Mod: 25

## 2020-03-16 PROCEDURE — 95012 NITRIC OXIDE EXP GAS DETER: CPT

## 2020-03-16 NOTE — REVIEW OF SYSTEMS
[Palpitations] : palpitations [Negative] : Endocrine [Nasal Congestion] : no nasal congestion [Sinus Problems] : no sinus problems [Chest Discomfort] : no chest discomfort [Orthopnea] : no orthopnea [GERD] : no gerd [Diarrhea] : no diarrhea [Constipation] : no constipation [Dysphagia] : no dysphagia

## 2020-03-16 NOTE — PROCEDURE
[FreeTextEntry1] : PFT revealed mild restrictive dysfunction, with a FEV1 of 2.33L, which is 76% of predicted, with a normal flow volume loop \par \par FENO was 100; a normal value being less than 25\par Fractional exhaled nitric oxide (FENO) is regarded as a simple, noninvasive method for assessing eosinophilic airway inflammation. Produced by a variety of cells within the lung, nitric oxide (NO) concentrations are generally low in healthy individuals. However, high concentrations of NO appear to be involved in nonspecific host defense mechanisms and chronic inflammatory diseases such as asthma. The American Thoracic Society (ATS) therefore has recommended using FENO to aid in the diagnosis and monitoring of eosinophilic airway inflammation and asthma, and for identifying steroid responsive individuals whose chronic respiratory symptoms may be caused by airway inflammation. \par \par Blood Work reveals wbc 6.53, eosinophils 4%, platelet 278 , hgb 14.4, hgb A1C 5.5, tsh 2.37\par \par Sleep study (2.3.2020) revealed sleep apnea with an AHI/MALDONADO of 9.2, snore index of 0.1% and a low oxygen saturation of 90.0%

## 2020-03-16 NOTE — ASSESSMENT
[FreeTextEntry1] : Mr. TADEO is a 69 year old male with a history of elevated PSA, HTN, elevated HLD, mitral valve prolapse, occupational exposure in the workplace (9/11) non-smoker who now comes to the office for a follow up pulmonary evaluation - specifically for SOB / cough / (+) OSAS, likely asthma / eosinophilia / allergy.\par \par His shortness of breath is multifactorial due to:\par -poor mechanics of breathing\par -out of shape / overweight\par -pulmonary disease\par ------- ? Asthma, r/o MEET\par -cardiac disease\par \par problem 1:  cardiac disease\par -recommended to continue to follow up with Cardiologist (Dr. Rick Pearson) \par \par problem 2 : poor breathing mechanics\par -Proper breathing techniques were reviewed with an emphasis of exhalation. Patient instructed to breath in for\par 1 second and out for four seconds. Patient was encouraged to not talk while walking.\par \par problem 3 : out of shape / overweight\par -Weight loss, exercise, and diet control were discussed and are highly encouraged. Treatment options were\par given such as, aqua therapy, and contacting a nutritionist. Recommended to use the elliptical, stationary bike,\par less use of treadmill. Mindful eating was explained to the patient Obesity is associated with worsening asthma,\par shortness of breath, and potential for cardiac disease, diabetes, and other underlying medical conditions\par \par Problem 4: R/o Asthma (likely)\par - Does not qualify for a methacholine challenge based on restrictive dysfunction - abnormal PFTs\par -continue Trelegy 1 puff QD (likely asthma) \par -s/p blood work to include: IgE level (-), eosinophil level (+), vitamin D level (-), food IgE level (-), and asthma profile  (+)\par Asthma is believed to be caused by inherited (genetic) and environmental factor, but its exact cause is unknown. Asthma may be triggered by allergens, lung infections, or irritants in the air. Asthma triggers are different for each person\par \par Problem 5 Occupational exposure in the workplace s/p 9/11\par - Regular follow-up going forward \par \par Problem 6: (+) (mild)MEET \par - Recommend Home sleep study (repeated 2/3/2020)\par -Recommended to get a dental device\par -s/p blood work to include : free and total testosterone, Iron studies, Ferritin level, &amp; complete thyroid function testing. (all negative)\par \par Sleep apnea is associated with adverse clinical consequences which can affect most organ systems.\par Cardiovascular disease risk includes arrhythmias, atrial fibrillation, hypertension, coronary artery disease,\par and stroke. Metabolic disorders include diabetes type 2, non-alcoholic fatty liver disease. Mood disorder\par especially depression; and cognitive decline especially in the elderly. Associations with chronic\par reflux/Rodriguez’s esophagus some but not all inclusive.\par -Reasons include arousal consistent with hypopnea; respiratory events most prominent in REM sleep or\par supine position; therefore sleep staging and body position are important for accurate diagnosis and\par estimation of AHI.\par \par Problem 7: Allergy \par -Add Claritin 10 mg QD, PRN\par Environmental measures for allergies were encouraged including mattress and pillow cover, air purifier, and environmental controls. \par \par problem 7 : health maintenance\par -recommended yearly flu shot after October 15 - s/p 2019 \par -recommended strep pneumonia vaccines: Prevnar-13 vaccine, followed by Pneumo vaccine 23 one year\par following (completed)\par -recommended early intervention for Upper Respiratory Infections (URIs)\par -recommended regular osteoporosis evaluations\par -recommended early dermatological evaluations\par -recommended after the age of 50 to the age of 70, colonoscopy every 5 years\par \par F/U in 3-4 months with [facility] \par He is encouraged to call with any changes, concerns, or questions\par

## 2020-03-16 NOTE — ADDENDUM
[FreeTextEntry1] : Documented by Melvin Islas acting as a scribe for Dr. Dexter Duran on 03/16/2020.\par \par All medical record entries made by the Scribe were at my, Dr. Dexter Duran's, direction and personally dictated by me on 03/16/2020. I have reviewed the chart and agree that the record accurately reflects my personal performance of the history, physical exam, assessment and plan. I have also personally directed, reviewed, and agree with the discharge instructions.

## 2020-03-16 NOTE — HISTORY OF PRESENT ILLNESS
[FreeTextEntry1] : Mr. 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 His chief complaint is SOB\par -he is currently feeling stressed\par -he notes his breathing has been good, but has had some SOB. He does not use his inhaler regularly\par -he notes he has occasional palpitations\par -he does not exercise formally but remains busy during the day\par -he currently has a dry cough\par -he is not currently using allergy medication\par -he wishes to lose weight \par -he denies any chest pain, chest pressure, diarrhea, constipation, dysphagia, dizziness, sour taste in the mouth, heartburn, reflux,

## 2020-04-24 ENCOUNTER — APPOINTMENT (OUTPATIENT)
Dept: CARDIOLOGY | Facility: CLINIC | Age: 70
End: 2020-04-24

## 2020-05-13 ENCOUNTER — APPOINTMENT (OUTPATIENT)
Dept: CARDIOLOGY | Facility: CLINIC | Age: 70
End: 2020-05-13

## 2020-06-09 RX ORDER — FLUTICASONE FUROATE, UMECLIDINIUM BROMIDE AND VILANTEROL TRIFENATATE 100; 62.5; 25 UG/1; UG/1; UG/1
100-62.5-25 POWDER RESPIRATORY (INHALATION)
Qty: 3 | Refills: 1 | Status: DISCONTINUED | COMMUNITY
Start: 2019-12-09 | End: 2020-06-09

## 2020-06-11 ENCOUNTER — APPOINTMENT (OUTPATIENT)
Dept: PULMONOLOGY | Facility: CLINIC | Age: 70
End: 2020-06-11
Payer: MEDICARE

## 2020-06-11 VITALS
WEIGHT: 181 LBS | SYSTOLIC BLOOD PRESSURE: 125 MMHG | DIASTOLIC BLOOD PRESSURE: 65 MMHG | BODY MASS INDEX: 27.43 KG/M2 | HEART RATE: 78 BPM | OXYGEN SATURATION: 96 % | RESPIRATION RATE: 17 BRPM | TEMPERATURE: 97.6 F | HEIGHT: 68 IN

## 2020-06-11 PROCEDURE — 99214 OFFICE O/P EST MOD 30 MIN: CPT

## 2020-06-11 NOTE — HISTORY OF PRESENT ILLNESS
[FreeTextEntry1] : Mr. TADEO is a 70 year old male with a history of  elevated PSA, HTN, elevated HLD, mitral valve prolapse, occupational exposure in the workplace (9/11) non-smoker who now comes to the office for a follow up pulmonary evaluation. His chief complaint is his right hand pain. \par - he has been dealing with pains\par - he has elevated PSA and an advanced 4k score. Came back 19. \par - he notes some palpitations sometimes, he stays away from situations that may make him uncomfortable \par - his right hand feels "electrified" he wanted a second Cortizone shot. He would like a new hand surgeon and neurologist. \par - he denies any coughing / wheezing \par - he has not been walking for exercise, he's usually up all day\par - he has been using trelegy \par - he has not been using the sleep gadget \par - his sleep has not been restful \par - he's getting a follow up with the urologist for his prostate \par -he denies any headaches, nausea, vomiting, fever, chills, sweats, chest pain, chest pressure, diarrhea, constipation, dysphagia, dizziness, sour taste in the mouth, leg swelling, leg pain, itchy eyes, itchy ears, heartburn, reflux, myalgias or arthralgias.

## 2020-06-11 NOTE — ASSESSMENT
[FreeTextEntry1] : Mr. TADEO is a 70 year old male with a history of elevated PSA, HTN, elevated HLD, mitral valve prolapse, occupational exposure in the workplace (9/11) non-smoker who now comes to the office for a follow up pulmonary evaluation - specifically for SOB / cough / (+) OSAS, likely asthma / eosinophilia / allergy. His #1 issue is right hand pain. \par \par His shortness of breath is multifactorial due to:\par -poor mechanics of breathing\par -out of shape / overweight\par -pulmonary disease\par ------- ? Asthma, (+) MEET\par -cardiac disease (Lili)\par \par problem 1:  cardiac disease\par -recommended to continue to follow up with Cardiologist (Dr. Rick Pearson) \par \par problem 2 : poor breathing mechanics\par -Proper breathing techniques were reviewed with an emphasis of exhalation. Patient instructed to breath in for\par 1 second and out for four seconds. Patient was encouraged to not talk while walking.\par \par problem 3 : out of shape / overweight\par -Weight loss, exercise, and diet control were discussed and are highly encouraged. Treatment options were\par given such as, aqua therapy, and contacting a nutritionist. Recommended to use the elliptical, stationary bike,\par less use of treadmill. Mindful eating was explained to the patient Obesity is associated with worsening asthma,\par shortness of breath, and potential for cardiac disease, diabetes, and other underlying medical conditions\par \par Problem 4: R/o Asthma (likely)\par - Does not qualify for a methacholine challenge based on restrictive dysfunction - abnormal PFTs\par -continue Trelegy 1 puff QD (likely asthma) \par -s/p blood work to include: IgE level (-), eosinophil level (+), vitamin D level (-), food IgE level (-), and asthma profile  (+)\par Asthma is believed to be caused by inherited (genetic) and environmental factor, but its exact cause is unknown. Asthma may be triggered by allergens, lung infections, or irritants in the air. Asthma triggers are different for each person\par \par Problem 5 Occupational exposure in the workplace s/p 9/11\par - Regular follow-up going forward \par \par Problem 6: (+) (mild)MEET \par - Recommend Home sleep study (repeated 2/3/2020)\par -Recommended to get a dental device\par -s/p blood work to include : free and total testosterone, Iron studies, Ferritin level, &amp; complete thyroid function testing. (all negative)\par \par Sleep apnea is associated with adverse clinical consequences which can affect most organ systems.\par Cardiovascular disease risk includes arrhythmias, atrial fibrillation, hypertension, coronary artery disease,\par and stroke. Metabolic disorders include diabetes type 2, non-alcoholic fatty liver disease. Mood disorder\par especially depression; and cognitive decline especially in the elderly. Associations with chronic\par reflux/Rodriguez’s esophagus some but not all inclusive.\par -Reasons include arousal consistent with hypopnea; respiratory events most prominent in REM sleep or\par supine position; therefore sleep staging and body position are important for accurate diagnosis and\par estimation of AHI.\par \par Problem 7: Allergy \par -continue Claritin 10 mg QD, PRN\par Environmental measures for allergies were encouraged including mattress and pillow cover, air purifier, and environmental controls. \par \par Problem 8: Health Maintenance/COVID19 Precautions:\par - Clean your hands often. Wash your hands often with soap and water for at least 20 seconds, especially after blowing your nose, coughing, or sneezing, or having been in a public place.\par - If soap and water are not available, use a hand  that contains at least 60% alcohol.\par - To the extent possible, avoid touching high-touch surfaces in public places - elevator buttons, door handles, handrails, handshaking with people, etc. Use a tissue or your sleeve to cover your hand or finger if you must touch something.\par - Wash your hands after touching surfaces in public places.\par - Avoid touching your face, nose, eyes, etc.\par - Clean and disinfect your home to remove germs: practice routine cleaning of frequently touched surfaces (for example: tables, doorknobs, light switches, handles, desks, toilets, faucets, sinks & cell phones)\par - Avoid crowds, especially in poorly ventilated spaces. Your risk of exposure to respiratory viruses like COVID-19 may increase in crowded, closed-in settings with little air circulation if there are people in the crowd who are sick. All patients are recommended to practice social distancing and stay at least 6 feet away from others.\par - Avoid all non-essential travel including plane trips, and especially avoid embarking on cruise ships.\par \par Immune Support Recommendations:\par -OTC Vitamin C 500mg BID \par -OTC Quercetin 250-500mg BID \par -OTC Zinc 75-100mg per day \par -OTC Melatonin 1 or 2 mg a night \par -OTC Vitamin D 1-4000mg per day \par -OTC Tonic Water 8oz per day\par \par \par problem 9 : health maintenance\par - Colonoscopy and prostate follow up \par -recommended yearly flu shot after October 15 - s/p 2019 \par -recommended strep pneumonia vaccines: Prevnar-13 vaccine, followed by Pneumo vaccine 23 one year\par following (completed)\par -recommended early intervention for Upper Respiratory Infections (URIs)\par -recommended regular osteoporosis evaluations\par -recommended early dermatological evaluations\par -recommended after the age of 50 to the age of 70, colonoscopy every 5 years\par \par F/U in 3-4 months with [facility] \par He is encouraged to call with any changes, concerns, or questions\par

## 2020-06-11 NOTE — ADDENDUM
[FreeTextEntry1] : Documented by Suzi Brunson acting as a scribe for Dr. Dexter Duran on 06/11/2020.\par \par All medical record entries made by the Scribe were at my, Dr. Dexter Duran's, direction and personally dictated by me on 06/11/2020. I have reviewed the chart and agree that the record accurately reflects my personal performance of the history, physical exam, assessment and plan. I have also personally directed, reviewed, and agree with the discharge instructions.

## 2020-06-11 NOTE — PHYSICAL EXAM
[General Appearance - Well Developed] : well developed [Normal Appearance] : normal appearance [Well Groomed] : well groomed [No Deformities] : no deformities [General Appearance - Well Nourished] : well nourished [Normal Conjunctiva] : the conjunctiva exhibited no abnormalities [General Appearance - In No Acute Distress] : no acute distress [Normal Oropharynx] : normal oropharynx [Eyelids - No Xanthelasma] : the eyelids demonstrated no xanthelasmas [Neck Appearance] : the appearance of the neck was normal [Jugular Venous Distention Increased] : there was no jugular-venous distention [Neck Cervical Mass (___cm)] : no neck mass was observed [Thyroid Diffuse Enlargement] : the thyroid was not enlarged [Thyroid Nodule] : there were no palpable thyroid nodules [Heart Sounds] : normal S1 and S2 [Murmurs] : no murmurs present [Heart Rate And Rhythm] : heart rate and rhythm were normal [Exaggerated Use Of Accessory Muscles For Inspiration] : no accessory muscle use [Respiration, Rhythm And Depth] : normal respiratory rhythm and effort [Auscultation Breath Sounds / Voice Sounds] : lungs were clear to auscultation bilaterally [Abdomen Soft] : soft [Abdomen Tenderness] : non-tender [Abdomen Mass (___ Cm)] : no abdominal mass palpated [Abnormal Walk] : normal gait [Gait - Sufficient For Exercise Testing] : the gait was sufficient for exercise testing [Nail Clubbing] : no clubbing of the fingernails [Cyanosis, Localized] : no localized cyanosis [Petechial Hemorrhages (___cm)] : no petechial hemorrhages [Skin Color & Pigmentation] : normal skin color and pigmentation [Skin Turgor] : normal skin turgor [] : no rash [Deep Tendon Reflexes (DTR)] : deep tendon reflexes were 2+ and symmetric [Sensation] : the sensory exam was normal to light touch and pinprick [No Focal Deficits] : no focal deficits [Oriented To Time, Place, And Person] : oriented to person, place, and time [Impaired Insight] : insight and judgment were intact [Affect] : the affect was normal [II] : II [FreeTextEntry1] : I:E ratio 1:3; clear

## 2020-08-04 ENCOUNTER — APPOINTMENT (OUTPATIENT)
Dept: CARDIOLOGY | Facility: CLINIC | Age: 70
End: 2020-08-04
Payer: MEDICARE

## 2020-08-04 ENCOUNTER — NON-APPOINTMENT (OUTPATIENT)
Age: 70
End: 2020-08-04

## 2020-08-04 VITALS
OXYGEN SATURATION: 98 % | HEIGHT: 68 IN | WEIGHT: 183 LBS | DIASTOLIC BLOOD PRESSURE: 84 MMHG | HEART RATE: 68 BPM | RESPIRATION RATE: 16 BRPM | BODY MASS INDEX: 27.74 KG/M2 | SYSTOLIC BLOOD PRESSURE: 122 MMHG

## 2020-08-04 DIAGNOSIS — Z87.898 PERSONAL HISTORY OF OTHER SPECIFIED CONDITIONS: ICD-10-CM

## 2020-08-04 PROCEDURE — 99213 OFFICE O/P EST LOW 20 MIN: CPT

## 2020-08-04 PROCEDURE — 93000 ELECTROCARDIOGRAM COMPLETE: CPT

## 2020-09-10 RX ORDER — BEMPEDOIC ACID AND EZETIMIBE 180; 10 MG/1; MG/1
180-10 TABLET, FILM COATED ORAL DAILY
Qty: 90 | Refills: 0 | Status: DISCONTINUED | COMMUNITY
Start: 2020-08-04 | End: 2020-09-10

## 2020-09-15 ENCOUNTER — APPOINTMENT (OUTPATIENT)
Dept: PULMONOLOGY | Facility: CLINIC | Age: 70
End: 2020-09-15
Payer: MEDICARE

## 2020-09-15 VITALS
TEMPERATURE: 97.6 F | HEIGHT: 68 IN | DIASTOLIC BLOOD PRESSURE: 70 MMHG | HEART RATE: 66 BPM | RESPIRATION RATE: 17 BRPM | SYSTOLIC BLOOD PRESSURE: 124 MMHG | OXYGEN SATURATION: 98 % | BODY MASS INDEX: 28.25 KG/M2 | WEIGHT: 186.38 LBS

## 2020-09-15 PROCEDURE — 99214 OFFICE O/P EST MOD 30 MIN: CPT | Mod: 25

## 2020-09-15 PROCEDURE — 95012 NITRIC OXIDE EXP GAS DETER: CPT

## 2020-09-15 PROCEDURE — 90662 IIV NO PRSV INCREASED AG IM: CPT

## 2020-09-15 PROCEDURE — G0008: CPT

## 2020-09-15 NOTE — PROCEDURE
[FreeTextEntry1] : Sleep study (Feb.3.2020) revealed sleep apnea with an AHI/AMLDONADO of  9.2, snore index of 0.1% and a low oxygen saturation of 90%  \par \par  FENO was 21; normal value being less than 25\par Fractional exhaled nitric oxide (FENO) is regarded as a simple, noninvasive method for assessing eosinophilic airway inflammation. Produced by a variety of cells within the lung, nitric oxide (NO) concentrations are generally low in healthy individuals. However, high concentrations of NO appear to be involved in nonspecific host defense mechanisms and chronic inflammatory diseases such as asthma. The American Thoracic Society (ATS) therefore has recommended using FENO to aid in the diagnosis and monitoring of eosinophilic airway inflammation and asthma, and for identifying steroid responsive individuals whose chronic respiratory symptoms may be airway inflammation.\par

## 2020-09-15 NOTE — ADDENDUM
[FreeTextEntry1] : Documented by Suzi Brunson acting as a scribe for Dr. Dexter Duran on 09/15/2020 \par \par All medical record entries made by the Scribe were at my, Dr. Dexter Duran's, direction and personally dictated by me on 09/15/2020 . I have reviewed the chart and agree that the record accurately reflects my personal performance of the history, physical exam, assessment and plan. I have also personally directed, reviewed, and agree with the discharge instructions.

## 2020-09-15 NOTE — ASSESSMENT
[FreeTextEntry1] : Mr. TADEO is a 70 year old male with a history of elevated PSA, HTN, elevated HLD, mitral valve prolapse, occupational exposure in the workplace (9/11) non-smoker who now comes to the office for a follow up pulmonary evaluation - specifically for SOB / cough / (+) OSAS, likely asthma / eosinophilia / allergy. His #1 issue is right hand pain. / #2 issue is fatigue. \par \par His shortness of breath is multifactorial due to:\par -poor mechanics of breathing\par -out of shape / overweight\par -pulmonary disease\par ------- ? Asthma, (+) MEET\par -cardiac disease ([place])\par \par problem 1:  cardiac disease\par -recommended to continue to follow up with Cardiologist (Dr. Rick Pearson) \par \par problem 2 : poor breathing mechanics\par -Proper breathing techniques were reviewed with an emphasis of exhalation. Patient instructed to breath in for\par 1 second and out for four seconds. Patient was encouraged to not talk while walking.\par \par problem 3 : out of shape / overweight\par -Weight loss, exercise, and diet control were discussed and are highly encouraged. Treatment options were\par given such as, aqua therapy, and contacting a nutritionist. Recommended to use the elliptical, stationary bike,\par less use of treadmill. Mindful eating was explained to the patient Obesity is associated with worsening asthma,\par shortness of breath, and potential for cardiac disease, diabetes, and other underlying medical conditions\par \par Problem 4: R/o Asthma (likely)\par - Does not qualify for a methacholine challenge based on restrictive dysfunction - abnormal PFTs\par -continue Trelegy 1 puff QD (likely asthma) \par -s/p blood work to include: IgE level (-), eosinophil level (+), vitamin D level (-), food IgE level (-), and asthma profile  (+)\par Asthma is believed to be caused by inherited (genetic) and environmental factor, but its exact cause is unknown. Asthma may be triggered by allergens, lung infections, or irritants in the air. Asthma triggers are different for each person\par \par Problem 5 Occupational exposure in the workplace s/p 9/11\par - Regular follow-up going forward \par \par Problem 6: (+) (mild)MEET \par - Recommend Home sleep study (repeated 2/3/2020)\par -Recommended to get a dental device ([place])\par -s/p blood work to include : free and total testosterone, Iron studies, Ferritin level, &amp; complete thyroid function testing. (all negative)\par \par Sleep apnea is associated with adverse clinical consequences which can affect most organ systems.\par Cardiovascular disease risk includes arrhythmias, atrial fibrillation, hypertension, coronary artery disease,\par and stroke. Metabolic disorders include diabetes type 2, non-alcoholic fatty liver disease. Mood disorder\par especially depression; and cognitive decline especially in the elderly. Associations with chronic\par reflux/Rodriguez’s esophagus some but not all inclusive.\par -Reasons include arousal consistent with hypopnea; respiratory events most prominent in REM sleep or\par supine position; therefore sleep staging and body position are important for accurate diagnosis and\par estimation of AHI.\par \par Problem 7: Allergy \par -continue Claritin 10 mg QD, PRN\par Environmental measures for allergies were encouraged including mattress and pillow cover, air purifier, and environmental controls. \par \par Problem 8: Health Maintenance/COVID19 Precautions:\par - Clean your hands often. Wash your hands often with soap and water for at least 20 seconds, especially after blowing your nose, coughing, or sneezing, or having been in a public place.\par - If soap and water are not available, use a hand  that contains at least 60% alcohol.\par - To the extent possible, avoid touching high-touch surfaces in public places - elevator buttons, door handles, handrails, handshaking with people, etc. Use a tissue or your sleeve to cover your hand or finger if you must touch something.\par - Wash your hands after touching surfaces in public places.\par - Avoid touching your face, nose, eyes, etc.\par - Clean and disinfect your home to remove germs: practice routine cleaning of frequently touched surfaces (for example: tables, doorknobs, light switches, handles, desks, toilets, faucets, sinks & cell phones)\par - Avoid crowds, especially in poorly ventilated spaces. Your risk of exposure to respiratory viruses like COVID-19 may increase in crowded, closed-in settings with little air circulation if there are people in the crowd who are sick. All patients are recommended to practice social distancing and stay at least 6 feet away from others.\par - Avoid all non-essential travel including plane trips, and especially avoid embarking on cruise ships.\par \par Immune Support Recommendations:\par -OTC Vitamin C 500mg BID \par -OTC Quercetin 250-500mg BID \par -OTC Zinc 75-100mg per day \par -OTC Melatonin 1 or 2 mg a night \par -OTC Vitamin D 1-4000mg per day \par -OTC Tonic Water 8oz per day\par \par \par problem 9 : health maintenance\par - Elias Barksdale evaluation \par - Colonoscopy and prostate follow up \par -recommended yearly flu shot - 9/2020 \par -recommended strep pneumonia vaccines: Prevnar-13 vaccine, followed by Pneumo vaccine 23 one year\par following (completed)\par -recommended early intervention for Upper Respiratory Infections (URIs)\par -recommended regular osteoporosis evaluations\par -recommended early dermatological evaluations\par -recommended after the age of 50 to the age of 70, colonoscopy every 5 years\par \par F/U in 3-4 months with [facility] \par He is encouraged to call with any changes, concerns, or questions\par

## 2020-09-15 NOTE — HISTORY OF PRESENT ILLNESS
[FreeTextEntry1] : Mr. TADEO is a 70 year old male with a history of  elevated PSA, HTN, elevated HLD, mitral valve prolapse, occupational exposure in the workplace (9/11) non-smoker who now comes to the office for a follow up pulmonary evaluation. His chief complaint is\par - he notes feeling numbness and pain in his hands. \par - he notes he has been having body pain, mostly his hips\par - bowel movements are regular \par - he has not been sleeping well. He's only in bed for 3-4 hours. \par - his weight has been stable, he feels his weight is still too high \par - his sinuses have been alright \par - his energy levels have been low, he has been napping every day since he does not get enough sleep\par - he snores a bit \par - he has itchy eyes on the inside \par - his sinuses are good \par - no palpitations \par - he has not been doing much exercise, he feels he's active a lot during the day \par - his energy levels are 6 out of 10 \par - he notes [facility] wants him to have a full breathing test \par - He  denies any visual issues, headaches, nausea, vomiting, fever, chills, sweats, chest pains, chest pressure, diarrhea, constipation, dysphagia, dizziness, leg swelling, leg pain, itchy eyes, itchy ears, heartburn, reflux, or sour taste in the mouth.

## 2020-09-15 NOTE — PHYSICAL EXAM
[General Appearance - Well Developed] : well developed [Well Groomed] : well groomed [Normal Appearance] : normal appearance [General Appearance - Well Nourished] : well nourished [Normal Conjunctiva] : the conjunctiva exhibited no abnormalities [Eyelids - No Xanthelasma] : the eyelids demonstrated no xanthelasmas [No Deformities] : no deformities [General Appearance - In No Acute Distress] : no acute distress [II] : II [Normal Oropharynx] : normal oropharynx [Neck Cervical Mass (___cm)] : no neck mass was observed [Neck Appearance] : the appearance of the neck was normal [Thyroid Diffuse Enlargement] : the thyroid was not enlarged [Jugular Venous Distention Increased] : there was no jugular-venous distention [Murmurs] : no murmurs present [Heart Rate And Rhythm] : heart rate and rhythm were normal [Heart Sounds] : normal S1 and S2 [Thyroid Nodule] : there were no palpable thyroid nodules [Respiration, Rhythm And Depth] : normal respiratory rhythm and effort [Exaggerated Use Of Accessory Muscles For Inspiration] : no accessory muscle use [Auscultation Breath Sounds / Voice Sounds] : lungs were clear to auscultation bilaterally [Abdomen Soft] : soft [Abdomen Tenderness] : non-tender [Gait - Sufficient For Exercise Testing] : the gait was sufficient for exercise testing [Abdomen Mass (___ Cm)] : no abdominal mass palpated [Abnormal Walk] : normal gait [Petechial Hemorrhages (___cm)] : no petechial hemorrhages [Nail Clubbing] : no clubbing of the fingernails [Cyanosis, Localized] : no localized cyanosis [Skin Turgor] : normal skin turgor [Skin Color & Pigmentation] : normal skin color and pigmentation [No Focal Deficits] : no focal deficits [Sensation] : the sensory exam was normal to light touch and pinprick [] : no rash [Deep Tendon Reflexes (DTR)] : deep tendon reflexes were 2+ and symmetric [Impaired Insight] : insight and judgment were intact [Oriented To Time, Place, And Person] : oriented to person, place, and time [Affect] : the affect was normal [FreeTextEntry1] : I:E ratio 1:3; clear

## 2020-11-30 ENCOUNTER — APPOINTMENT (OUTPATIENT)
Dept: CARDIOLOGY | Facility: CLINIC | Age: 70
End: 2020-11-30
Payer: MEDICARE

## 2020-11-30 VITALS
HEART RATE: 63 BPM | RESPIRATION RATE: 16 BRPM | SYSTOLIC BLOOD PRESSURE: 122 MMHG | BODY MASS INDEX: 27.89 KG/M2 | WEIGHT: 184 LBS | HEIGHT: 68 IN | OXYGEN SATURATION: 98 % | DIASTOLIC BLOOD PRESSURE: 82 MMHG

## 2020-11-30 PROCEDURE — ZZZZZ: CPT

## 2020-11-30 PROCEDURE — 99213 OFFICE O/P EST LOW 20 MIN: CPT

## 2020-11-30 PROCEDURE — 93306 TTE W/DOPPLER COMPLETE: CPT

## 2021-01-19 ENCOUNTER — APPOINTMENT (OUTPATIENT)
Dept: PULMONOLOGY | Facility: CLINIC | Age: 71
End: 2021-01-19
Payer: MEDICARE

## 2021-01-19 VITALS
TEMPERATURE: 97.9 F | HEART RATE: 73 BPM | BODY MASS INDEX: 27.74 KG/M2 | OXYGEN SATURATION: 98 % | SYSTOLIC BLOOD PRESSURE: 130 MMHG | RESPIRATION RATE: 17 BRPM | DIASTOLIC BLOOD PRESSURE: 80 MMHG | WEIGHT: 183 LBS | HEIGHT: 68 IN

## 2021-01-19 DIAGNOSIS — Z57.9 OCCUPATIONAL EXPOSURE TO UNSPECIFIED RISK FACTOR: ICD-10-CM

## 2021-01-19 PROCEDURE — 99214 OFFICE O/P EST MOD 30 MIN: CPT

## 2021-01-19 SDOH — HEALTH STABILITY - PHYSICAL HEALTH: OCCUPATIONAL EXPOSURE TO UNSPECIFIED RISK FACTOR: Z57.9

## 2021-01-19 NOTE — PHYSICAL EXAM
[General Appearance - Well Developed] : well developed [Normal Appearance] : normal appearance [Well Groomed] : well groomed [General Appearance - Well Nourished] : well nourished [No Deformities] : no deformities [General Appearance - In No Acute Distress] : no acute distress [Normal Conjunctiva] : the conjunctiva exhibited no abnormalities [Eyelids - No Xanthelasma] : the eyelids demonstrated no xanthelasmas [Normal Oropharynx] : normal oropharynx [II] : II [Neck Appearance] : the appearance of the neck was normal [Jugular Venous Distention Increased] : there was no jugular-venous distention [Neck Cervical Mass (___cm)] : no neck mass was observed [Thyroid Diffuse Enlargement] : the thyroid was not enlarged [Thyroid Nodule] : there were no palpable thyroid nodules [Heart Rate And Rhythm] : heart rate and rhythm were normal [Heart Sounds] : normal S1 and S2 [Murmurs] : no murmurs present [Respiration, Rhythm And Depth] : normal respiratory rhythm and effort [Exaggerated Use Of Accessory Muscles For Inspiration] : no accessory muscle use [Auscultation Breath Sounds / Voice Sounds] : lungs were clear to auscultation bilaterally [Abdomen Soft] : soft [Abdomen Tenderness] : non-tender [Abdomen Mass (___ Cm)] : no abdominal mass palpated [Abnormal Walk] : normal gait [Gait - Sufficient For Exercise Testing] : the gait was sufficient for exercise testing [Nail Clubbing] : no clubbing of the fingernails [Cyanosis, Localized] : no localized cyanosis [Petechial Hemorrhages (___cm)] : no petechial hemorrhages [Skin Color & Pigmentation] : normal skin color and pigmentation [Skin Turgor] : normal skin turgor [] : no rash [Deep Tendon Reflexes (DTR)] : deep tendon reflexes were 2+ and symmetric [Sensation] : the sensory exam was normal to light touch and pinprick [No Focal Deficits] : no focal deficits [Oriented To Time, Place, And Person] : oriented to person, place, and time [Impaired Insight] : insight and judgment were intact [Affect] : the affect was normal [FreeTextEntry1] : I:E ratio 1:3; clear

## 2021-01-19 NOTE — ADDENDUM
[FreeTextEntry1] : Documented by Honorio Carver acting as a scribe for Dr. Dexter Duran on (01/19/2021).\par \par All medical record entries made by the Scribe were at my, Dr. Dexter Duran's, direction and personally dictated by me on (01/19/2021). I have reviewed the chart and agree that the record accurately reflects my personal performance of the history, physical exam, assessment and plan. I have also personally directed, reviewed, and agree with the discharge instructions.

## 2021-01-19 NOTE — HISTORY OF PRESENT ILLNESS
[FreeTextEntry1] : Mr. TADEO is a 70 year old male with a history of  elevated PSA, HTN, elevated HLD, mitral valve prolapse, occupational exposure in the workplace (9/11) non-smoker who now comes to the office for a follow up pulmonary evaluation. His chief complaint is joint pain in right hands \par -He notes feeling well in general \par -He note feeling joint pains in the feet and hands \par -He notes its better due to being on cholesterol pill\par -He notes having interrupted sleep\par -He notes getting up at midnight and being back up at 430am\par -He notes some dizziness\par -He notes his toes are bending and getting deformed \par -He notes his overall energy level is good, moving all day \par - No new medications, vitamins or supplements \par -He notes being s/p colonoscopy, showing 2 small benign polyps \par -He denies coughing and wheezing \par -He notes his stamina is lower than usual, attributing it to an asthma flair \par - He denies palpitations since starting his new cholesterol medication\par -He notes his sinuses are clear, outside of his deviated septum occasionally getting clogged \par -He notes his bowels are regular \par -He notes first starting respiratory symptoms more than 10 years ago \par -He notes just seeing a PCP for his cardiac \par -\par \par -denies any headaches, nausea, vomiting, fever, chills, sweats, chest pain, chest pressure, diarrhea, constipation, dysphagia, leg swelling, leg pain, itchy eyes, itchy ears, heartburn, reflux, or sour taste in the mouth.

## 2021-01-19 NOTE — ASSESSMENT
[FreeTextEntry1] : Mr. TADEO is a 70 year old male with a history of elevated PSA, HTN, elevated HLD, mitral valve prolapse, occupational exposure in the workplace (9/11) non-smoker who now comes to the office for a follow up pulmonary evaluation - specifically for SOB / cough / (+) OSAS, likely asthma / eosinophilia / allergy. His #1 issue is right hand pain. / #2 issue is fatigue. (still)\par \par His shortness of breath is multifactorial due to:\par -poor mechanics of breathing\par -out of shape / overweight\par -pulmonary disease\par ------- ? Asthma, (+) MEET\par -cardiac disease ([place])\par \par problem 1:  cardiac disease\par -recommended to continue to follow up with Cardiologist (Dr. Rick Pearson) (notes reviewed)\par \par problem 2 : poor breathing mechanics\par -Proper breathing techniques were reviewed with an emphasis of exhalation. Patient instructed to breath in for\par 1 second and out for four seconds. Patient was encouraged to not talk while walking.\par \par problem 3 : out of shape / overweight\par -Weight loss, exercise, and diet control were discussed and are highly encouraged. Treatment options were\par given such as, aqua therapy, and contacting a nutritionist. Recommended to use the elliptical, stationary bike,\par less use of treadmill. Mindful eating was explained to the patient Obesity is associated with worsening asthma,\par shortness of breath, and potential for cardiac disease, diabetes, and other underlying medical conditions\par \par Problem 4: R/o Asthma (likely) -stable, s/p 9/11\par - Does not qualify for a methacholine challenge based on restrictive dysfunction - abnormal PFTs\par -continue Trelegy 1 puff QD (likely asthma) \par -s/p blood work to include: IgE level (-), eosinophil level (+), vitamin D level (-), food IgE level (-), and asthma profile  (+)\par Asthma is believed to be caused by inherited (genetic) and environmental factor, but its exact cause is unknown. Asthma may be triggered by allergens, lung infections, or irritants in the air. Asthma triggers are different for each person\par \par Problem 5 Occupational exposure in the workplace s/p 9/11\par - Regular follow-up going forward \par \par Problem 6: (+) (mild)MEET \par - Recommend Home sleep study (repeated 2/3/2020)\par -Recommended to get a dental device ([place])\par -s/p blood work to include : free and total testosterone, Iron studies, Ferritin level, &amp; complete thyroid function testing. (all negative)\par \par Sleep apnea is associated with adverse clinical consequences which can affect most organ systems.\par Cardiovascular disease risk includes arrhythmias, atrial fibrillation, hypertension, coronary artery disease,\par and stroke. Metabolic disorders include diabetes type 2, non-alcoholic fatty liver disease. Mood disorder\par especially depression; and cognitive decline especially in the elderly. Associations with chronic\par reflux/Rodriguez’s esophagus some but not all inclusive.\par -Reasons include arousal consistent with hypopnea; respiratory events most prominent in REM sleep or\par supine position; therefore sleep staging and body position are important for accurate diagnosis and\par estimation of AHI.\par \par Problem 7: Allergy \par -continue Claritin 10 mg QD, PRN\par Environmental measures for allergies were encouraged including mattress and pillow cover, air purifier, and environmental controls. \par \par Problem 8: Health Maintenance/COVID19 Precautions:\par - Clean your hands often. Wash your hands often with soap and water for at least 20 seconds, especially after blowing your nose, coughing, or sneezing, or having been in a public place.\par - If soap and water are not available, use a hand  that contains at least 60% alcohol.\par - To the extent possible, avoid touching high-touch surfaces in public places - elevator buttons, door handles, handrails, handshaking with people, etc. Use a tissue or your sleeve to cover your hand or finger if you must touch something.\par - Wash your hands after touching surfaces in public places.\par - Avoid touching your face, nose, eyes, etc.\par - Clean and disinfect your home to remove germs: practice routine cleaning of frequently touched surfaces (for example: tables, doorknobs, light switches, handles, desks, toilets, faucets, sinks & cell phones)\par - Avoid crowds, especially in poorly ventilated spaces. Your risk of exposure to respiratory viruses like COVID-19 may increase in crowded, closed-in settings with little air circulation if there are people in the crowd who are sick. All patients are recommended to practice social distancing and stay at least 6 feet away from others.\par - Avoid all non-essential travel including plane trips, and especially avoid embarking on cruise ships.\par \par Immune Support Recommendations:\par -OTC Vitamin C 500mg BID \par -OTC Quercetin 250-500mg BID \par -OTC Zinc 75-100mg per day \par -OTC Melatonin 1 or 2 mg a night \par -OTC Vitamin D 1-4000mg per day \par -OTC Tonic Water 8oz per day\par \par \par problem 9 : health maintenance\par -Covid 19 Vaccine (pending)\par -Recommend rheumatologist Dr. Manzano\par - Elias Barksdale evaluation \par - Colonoscopy and prostate follow up \par -recommended yearly flu shot - 9/2020 \par -recommended strep pneumonia vaccines: Prevnar-13 vaccine, followed by Pneumo vaccine 23 one year\par following (completed)\par -recommended early intervention for Upper Respiratory Infections (URIs)\par -recommended regular osteoporosis evaluations\par -recommended early dermatological evaluations\par -recommended after the age of 50 to the age of 70, colonoscopy every 5 years\par \par F/U in 3-4 months with [facility] \par He is encouraged to call with any changes, concerns, or questions\par

## 2021-02-23 ENCOUNTER — APPOINTMENT (OUTPATIENT)
Dept: CARDIOLOGY | Facility: CLINIC | Age: 71
End: 2021-02-23
Payer: MEDICARE

## 2021-02-23 ENCOUNTER — NON-APPOINTMENT (OUTPATIENT)
Age: 71
End: 2021-02-23

## 2021-02-23 VITALS
BODY MASS INDEX: 17.73 KG/M2 | WEIGHT: 117 LBS | SYSTOLIC BLOOD PRESSURE: 112 MMHG | HEART RATE: 57 BPM | DIASTOLIC BLOOD PRESSURE: 80 MMHG | HEIGHT: 68 IN | RESPIRATION RATE: 16 BRPM

## 2021-02-23 PROCEDURE — 93000 ELECTROCARDIOGRAM COMPLETE: CPT

## 2021-02-23 PROCEDURE — 99214 OFFICE O/P EST MOD 30 MIN: CPT

## 2021-05-19 ENCOUNTER — APPOINTMENT (OUTPATIENT)
Dept: PULMONOLOGY | Facility: CLINIC | Age: 71
End: 2021-05-19
Payer: MEDICARE

## 2021-05-19 VITALS
HEIGHT: 67 IN | HEART RATE: 61 BPM | RESPIRATION RATE: 16 BRPM | WEIGHT: 180 LBS | BODY MASS INDEX: 28.25 KG/M2 | OXYGEN SATURATION: 98 % | TEMPERATURE: 97.5 F | SYSTOLIC BLOOD PRESSURE: 110 MMHG | DIASTOLIC BLOOD PRESSURE: 70 MMHG

## 2021-05-19 PROCEDURE — 99214 OFFICE O/P EST MOD 30 MIN: CPT

## 2021-05-19 NOTE — ADDENDUM
[FreeTextEntry1] : Documented by Cesar Shea acting as a scribe for Dr. Dexter Duran on 05/19/2021.\par \par All medical record entries made by the Scribe were at my, Dr. Dexter Duran's, direction and personally dictated by me on 05/19/2021 . I have reviewed the chart and agree that the record accurately reflects my personal performance of the history, physical exam, assessment and plan. I have also personally directed, reviewed, and agree with the discharge instructions. \par

## 2021-05-19 NOTE — ASSESSMENT
[FreeTextEntry1] : Mr. TADEO is a 71 year old male with a history of elevated PSA, HTN, elevated HLD, mitral valve prolapse, occupational exposure in the workplace (9/11) non-smoker who now comes to the office for a follow up pulmonary evaluation - specifically for SOB / cough / (+) OSAS, likely asthma / eosinophilia / allergy. His #1 issue is right hand pain/ body pain / #2 issue is fatigue. (still)\par \par His shortness of breath is multifactorial due to:\par -poor mechanics of breathing\par -out of shape / overweight\par -pulmonary disease\par ------- ? Asthma, (+) MEET\par -cardiac disease (Lili)\par \par problem 1:  cardiac disease\par -recommended to continue to follow up with Cardiologist (Dr. Rick Pearson) (notes reviewed)\par \par problem 2 : poor breathing mechanics\par -recommended breathing techniques Morgan Maldonado \par -Proper breathing techniques were reviewed with an emphasis of exhalation. Patient instructed to breath in for\par 1 second and out for four seconds. Patient was encouraged to not talk while walking.\par \par problem 3 : out of shape / overweight\par -Weight loss, exercise, and diet control were discussed and are highly encouraged. Treatment options were\par given such as, aqua therapy, and contacting a nutritionist. Recommended to use the elliptical, stationary bike,\par less use of treadmill. Mindful eating was explained to the patient Obesity is associated with worsening asthma,\par shortness of breath, and potential for cardiac disease, diabetes, and other underlying medical conditions\par \par Problem 4: R/o Asthma (likely) -stable, s/p 9/11\par - Does not qualify for a methacholine challenge based on restrictive dysfunction - abnormal PFTs\par -continue Trelegy 100 1 puff QD (likely asthma) \par -s/p blood work to include: IgE level (-), eosinophil level (+), vitamin D level (-), food IgE level (-), and asthma profile  (+)\par Asthma is believed to be caused by inherited (genetic) and environmental factor, but its exact cause is unknown. Asthma may be triggered by allergens, lung infections, or irritants in the air. Asthma triggers are different for each person\par \par Problem 5 Occupational exposure in the workplace s/p 9/11\par - Regular follow-up going forward \par \par Problem 6: (+) (mild)MEET \par - Recommend Home sleep study (repeated 2/3/2020)\par -Recommended to get a dental device (Danoff)\par -s/p blood work to include : free and total testosterone, Iron studies, Ferritin level, &amp; complete thyroid function testing. (all negative)\par -recommended Slumber Bump\par -recommended Somnifix\par -recommended OxyAid/ NasalAid\par \par Sleep apnea is associated with adverse clinical consequences which can affect most organ systems.\par Cardiovascular disease risk includes arrhythmias, atrial fibrillation, hypertension, coronary artery disease,\par and stroke. Metabolic disorders include diabetes type 2, non-alcoholic fatty liver disease. Mood disorder\par especially depression; and cognitive decline especially in the elderly. Associations with chronic\par reflux/Rodriguez’s esophagus some but not all inclusive.\par -Reasons include arousal consistent with hypopnea; respiratory events most prominent in REM sleep or\par supine position; therefore sleep staging and body position are important for accurate diagnosis and\par estimation of AHI.\par \par Problem 7: Allergy \par -continue Claritin 10 mg QD, PRN\par Environmental measures for allergies were encouraged including mattress and pillow cover, air purifier, and environmental controls. \par \par Problem 8: Health Maintenance/COVID19 Precautions:/ ? s/p COVID 19 infection (?when) \par -s/p Pfizer COVID 19 vaccine x 2\par - Clean your hands often. Wash your hands often with soap and water for at least 20 seconds, especially after blowing your nose, coughing, or sneezing, or having been in a public place.\par - If soap and water are not available, use a hand  that contains at least 60% alcohol.\par - To the extent possible, avoid touching high-touch surfaces in public places - elevator buttons, door handles, handrails, handshaking with people, etc. Use a tissue or your sleeve to cover your hand or finger if you must touch something.\par - Wash your hands after touching surfaces in public places.\par - Avoid touching your face, nose, eyes, etc.\par - Clean and disinfect your home to remove germs: practice routine cleaning of frequently touched surfaces (for example: tables, doorknobs, light switches, handles, desks, toilets, faucets, sinks & cell phones)\par - Avoid crowds, especially in poorly ventilated spaces. Your risk of exposure to respiratory viruses like COVID-19 may increase in crowded, closed-in settings with little air circulation if there are people in the crowd who are sick. All patients are recommended to practice social distancing and stay at least 6 feet away from others.\par - Avoid all non-essential travel including plane trips, and especially avoid embarking on cruise ships.\par \par COVID-19 precautionary Immune Support Recommendations:\par -OTC Vitamin C 1000mg BID \par -OTC Quercetin 500mg BID \par -OTC Zinc 50 mg per day \par -OTC Melatonin 5 mg a night \par -OTC Vitamin D 2000mg per day \par -OTC Tonic Water 8oz per day\par -Water 0.5-1 gallon per day\par \par Additional Support Supplements: \par -Liposomal Glutathione 500 mg BID\par -SPM 1 tablet BID \par -Berberine 1000 mg BID  \par - mg BID\par \par problem 9 : health maintenance\par -Recommend rheumatologist Dr. Manzano\par - Hand - Feng [place]ks[place] evaluation \par - Colonoscopy and prostate follow up \par -recommended yearly flu shot - 9/2020 \par -recommended strep pneumonia vaccines: Prevnar-13 vaccine, followed by Pneumo vaccine 23 one year\par following (completed)\par -recommended early intervention for Upper Respiratory Infections (URIs)\par -recommended regular osteoporosis evaluations\par -recommended early dermatological evaluations\par -recommended after the age of 50 to the age of 70, colonoscopy every 5 years\par \par F/U in 3-4 months with [facility] \par He is encouraged to call with any changes, concerns, or questions\par 
Polysubstance dependence

## 2021-05-19 NOTE — PHYSICAL EXAM
[General Appearance - Well Developed] : well developed [Normal Appearance] : normal appearance [Well Groomed] : well groomed [General Appearance - Well Nourished] : well nourished [No Deformities] : no deformities [General Appearance - In No Acute Distress] : no acute distress [Normal Conjunctiva] : the conjunctiva exhibited no abnormalities [Eyelids - No Xanthelasma] : the eyelids demonstrated no xanthelasmas [Normal Oropharynx] : normal oropharynx [II] : II [Neck Cervical Mass (___cm)] : no neck mass was observed [Neck Appearance] : the appearance of the neck was normal [Jugular Venous Distention Increased] : there was no jugular-venous distention [Thyroid Diffuse Enlargement] : the thyroid was not enlarged [Thyroid Nodule] : there were no palpable thyroid nodules [Heart Rate And Rhythm] : heart rate and rhythm were normal [Heart Sounds] : normal S1 and S2 [Murmurs] : no murmurs present [Respiration, Rhythm And Depth] : normal respiratory rhythm and effort [Exaggerated Use Of Accessory Muscles For Inspiration] : no accessory muscle use [Auscultation Breath Sounds / Voice Sounds] : lungs were clear to auscultation bilaterally [Abdomen Soft] : soft [Abdomen Tenderness] : non-tender [Abdomen Mass (___ Cm)] : no abdominal mass palpated [Abnormal Walk] : normal gait [Gait - Sufficient For Exercise Testing] : the gait was sufficient for exercise testing [Nail Clubbing] : no clubbing of the fingernails [Cyanosis, Localized] : no localized cyanosis [Petechial Hemorrhages (___cm)] : no petechial hemorrhages [Skin Color & Pigmentation] : normal skin color and pigmentation [] : no rash [Skin Turgor] : normal skin turgor [Deep Tendon Reflexes (DTR)] : deep tendon reflexes were 2+ and symmetric [Sensation] : the sensory exam was normal to light touch and pinprick [No Focal Deficits] : no focal deficits [Oriented To Time, Place, And Person] : oriented to person, place, and time [Impaired Insight] : insight and judgment were intact [Affect] : the affect was normal [FreeTextEntry1] : I:E ratio 1:3; clear

## 2021-05-19 NOTE — HISTORY OF PRESENT ILLNESS
[FreeTextEntry1] : Mr. TADEO is a 71 year old male with a history of  elevated PSA, HTN, elevated HLD, mitral valve prolapse, occupational exposure in the workplace (9/11) non-smoker who now comes to the office for a follow up pulmonary evaluation. His chief complaint is\par \par -he notes C bloodwork revealed positive for COVID 19 and antibodies present\par -he notes constant fatigue\par -he notes poor sleep quality\par -he notes sleep pattern broken\par -he notes sleeping 3 -5 hours a night on average\par -he notes issues staying asleep exacerbated by nocturia\par -he notes intermittent right chest pain\par -he notes regular bowel movements\par -he notes intermittent itchy eyes\par -he notes intermittent itchy ears\par -he notes mild ankle swelling\par -he notes SOB upon bending exacerbated by exertion \par -he notes weight increased from baseline \par -he notes poor diet\par -he notes sleep quality would be better if wasn't caring for pet cats\par -he notes intermittent leg swelling\par -he notes bilateral hand strength decreasing \par -he notes general arthralgias and myalgias total body\par \par -denies any chest pressure, diarrhea, constipation, dysphagia, sour taste in the mouth, dizziness, leg swelling, leg pain, itchy eyes, itchy ears, heartburn, or reflux.\par \par \par

## 2021-05-20 ENCOUNTER — APPOINTMENT (OUTPATIENT)
Dept: PULMONOLOGY | Facility: CLINIC | Age: 71
End: 2021-05-20

## 2021-05-25 ENCOUNTER — NON-APPOINTMENT (OUTPATIENT)
Age: 71
End: 2021-05-25

## 2021-05-25 ENCOUNTER — APPOINTMENT (OUTPATIENT)
Dept: PULMONOLOGY | Facility: CLINIC | Age: 71
End: 2021-05-25
Payer: MEDICARE

## 2021-05-25 PROCEDURE — 94010 BREATHING CAPACITY TEST: CPT

## 2021-05-25 PROCEDURE — 95012 NITRIC OXIDE EXP GAS DETER: CPT

## 2021-10-15 ENCOUNTER — APPOINTMENT (OUTPATIENT)
Dept: CARDIOLOGY | Facility: CLINIC | Age: 71
End: 2021-10-15
Payer: MEDICARE

## 2021-10-15 ENCOUNTER — LABORATORY RESULT (OUTPATIENT)
Age: 71
End: 2021-10-15

## 2021-10-15 ENCOUNTER — APPOINTMENT (OUTPATIENT)
Dept: PULMONOLOGY | Facility: CLINIC | Age: 71
End: 2021-10-15
Payer: MEDICARE

## 2021-10-15 ENCOUNTER — NON-APPOINTMENT (OUTPATIENT)
Age: 71
End: 2021-10-15

## 2021-10-15 VITALS
HEIGHT: 67 IN | TEMPERATURE: 98.2 F | BODY MASS INDEX: 28.25 KG/M2 | WEIGHT: 180 LBS | HEART RATE: 59 BPM | RESPIRATION RATE: 16 BRPM | OXYGEN SATURATION: 98 % | SYSTOLIC BLOOD PRESSURE: 115 MMHG | DIASTOLIC BLOOD PRESSURE: 70 MMHG

## 2021-10-15 DIAGNOSIS — Z23 ENCOUNTER FOR IMMUNIZATION: ICD-10-CM

## 2021-10-15 PROCEDURE — 99214 OFFICE O/P EST MOD 30 MIN: CPT | Mod: 25

## 2021-10-15 PROCEDURE — 90662 IIV NO PRSV INCREASED AG IM: CPT

## 2021-10-15 PROCEDURE — G0008: CPT

## 2021-10-15 PROCEDURE — 93000 ELECTROCARDIOGRAM COMPLETE: CPT

## 2021-10-15 NOTE — ASSESSMENT
[FreeTextEntry1] : This is a 71 year year old male here today for follow up cardiac evaluation. \par He has a past medical history significant for anxiety, hypertension, status post bilateral hernia repair, status post bilateral varicose vein surgery.\par \par Today he is feeling generally well from a cardiac standpoint. He follows with his pulmonologist Dr. Duran for management of his Asthma and is so far feeling well today. He is interested in getting his flu shot and will be following up with his pulmonologist as needed. \par \par -Cardiac risk factors include HTN and HLD.\par \par BLOOD PRESSURE:\par -BP is well controlled in today's visit and patient is on Bystolic 10mg PO DAILY and Telmisartan HCTZ 80/25mg PO DAILY.\par \par BLOOD WORK:\par -New blood work was done today 10/15/2021 to evaluate lipid profile, CBC, BMP, hepatic function, A1C and TSH. Pt currently taking Zetia 10mg  PO Daily.\par -He states that he had leg cramps and soreness while on his Atorvastatin in the past.\par \par TESTING:\par -EKG done 10/15/2021 which demonstrated regular sinus rhythm with nonspecific ST-T wave changes, BPM of 51.\par \par PLAN:\par -He will continue with his usual medications and will contact the office if he is having any complaints between now and their next follow up appointment.\par -The patient will schedule an Echo Doppler examination to evaluate murmur, left ventricular function, chamber size, and rule out hypertrophy. \par -He will follow up with his pulmonologist.\par \par I have discussed the plan of care with Mr. TERI TADEO  and he  will follow up in 3 months. He is compliant with all of his  medications.\par \par The patient understands that aerobic exercises must be increased to minutes 4 times/week and a detailed discussion of lifestyle modification was done today. \par The patient has a good understanding of the diagnosis, treatment plan and lifestyle modification. \par He will contact me at the office for any questions with their care or any changes in their health status.\par \par The plan of care was discussed with supervising physician, Dr. Pearson while present in the office at the time of the visit. \par \par Adriana DUFFY

## 2021-10-15 NOTE — CARDIOLOGY SUMMARY
[___] : [unfilled] [LVEF ___%] : LVEF [unfilled]% [de-identified] : 10/15/2021 [de-identified] : 2019 [de-identified] : 11/30/2020

## 2021-10-15 NOTE — DISCUSSION/SUMMARY
[FreeTextEntry1] : Dr. Pearson-(PRIOR VISIT and PMH WITH Dr. Pearson): \par This is a 70-year-old male with past medical history significant for anxiety, hypertension, status post bilateral hernia repair, status post bilateral varicose vein surgery, who comes in for cardiac follow up evaluation. He denies chest pain, shortness breath, dizziness or syncope.  \par \par The patient's blood pressure is under good control.  He will continue his current medication including Micardis hydrochlorothiazide 80/25 mg/day, Bystolic 10 mg, and Zetia therapy.\par The patient had blood work done August 4, 2020 and demonstrated cholesterol 164, triglycerides 47, HDL of 57, direct LDL of 99 mg/dL, and potassium of 4.1.\par \par The patient will continue on his usual medications.\par \par The patient was unable to tolerate laying on the table for the nuclear stress test and therefore was unable to complete the examination.\par \par The patient's blood pressure is acceptable.\par \par Echo Doppler examination done August 7, 2019 which demonstrated mild mitral, tricuspid, and pulmonic valve regurgitation with mitral valve prolapse and thickened aortic valve leaflets.  His estimated ejection fraction is 65%.\par \par A lipid panel done June 24, 2019 demonstrated direct LDL cholesterol 147 mg/dL, hemoglobin A1c of 5.6, Restoril 208 mg/dL, triglycerides 34 mg/dL, and HDL direct 69 mg/dL.\par \par Electrocardiogram done Hillary 10, 2019 demonstrated normal sinus rhythm at 68 beats per minute is otherwise remarkable for poor R-wave progression.\par \par The patient had a normal exercise stress test August 6, 2019.  He has no history of rheumatic fever.  He does not drink excessive caffeine or alcohol.  He has no known allergies.\par \par The patient understands that aerobic exercises must be increased to 40 minutes 4 times per week. A detailed discussion of lifestyle modification was done today. The patient has a good understanding of the diagnosis, and treatment plan. Lifestyle modification was also outlined.

## 2021-10-15 NOTE — REASON FOR VISIT
[Follow-Up - Clinic] : a clinic follow-up of [Dyspnea] : dyspnea [Hyperlipidemia] : hyperlipidemia [Hypertension] : hypertension [FreeTextEntry1] : murmur

## 2021-10-15 NOTE — PHYSICAL EXAM
[Well Developed] : well developed [Well Nourished] : well nourished [No Acute Distress] : no acute distress [Normal Venous Pressure] : normal venous pressure [No Carotid Bruit] : no carotid bruit [Normal S1, S2] : normal S1, S2 [No Murmur] : no murmur [No Rub] : no rub [Clear Lung Fields] : clear lung fields [Good Air Entry] : good air entry [No Respiratory Distress] : no respiratory distress  [Soft] : abdomen soft [Non Tender] : non-tender [No Masses/organomegaly] : no masses/organomegaly [Normal Bowel Sounds] : normal bowel sounds [Normal Gait] : normal gait [No Edema] : no edema [No Cyanosis] : no cyanosis [No Clubbing] : no clubbing [No Varicosities] : no varicosities [No Skin Lesions] : no skin lesions [No Rash] : no rash [Moves all extremities] : moves all extremities [No Focal Deficits] : no focal deficits [Alert and Oriented] : alert and oriented [Normal Speech] : normal speech [Normal memory] : normal memory [General Appearance - Well Developed] : well developed [Normal Appearance] : normal appearance [Well Groomed] : well groomed [General Appearance - Well Nourished] : well nourished [No Deformities] : no deformities [General Appearance - In No Acute Distress] : no acute distress [Normal Conjunctiva] : the conjunctiva exhibited no abnormalities [Normal Oral Mucosa] : normal oral mucosa [Normal Oropharynx] : normal oropharynx [Normal Jugular Venous A Waves Present] : normal jugular venous A waves present [Normal Jugular Venous V Waves Present] : normal jugular venous V waves present [No Jugular Venous Farris A Waves] : no jugular venous farris A waves [Respiration, Rhythm And Depth] : normal respiratory rhythm and effort [Exaggerated Use Of Accessory Muscles For Inspiration] : no accessory muscle use [Auscultation Breath Sounds / Voice Sounds] : lungs were clear to auscultation bilaterally [Bowel Sounds] : normal bowel sounds [Abdomen Soft] : soft [Abnormal Walk] : normal gait [Gait - Sufficient For Exercise Testing] : the gait was sufficient for exercise testing [Nail Clubbing] : no clubbing of the fingernails [Cyanosis, Localized] : no localized cyanosis [Skin Color & Pigmentation] : normal skin color and pigmentation [Skin Turgor] : normal skin turgor [] : no rash [Oriented To Time, Place, And Person] : oriented to person, place, and time [Affect] : the affect was normal [Mood] : the mood was normal [5th Left ICS - MCL] : palpated at the 5th LICS in the midclavicular line [Normal] : normal [No Precordial Heave] : no precordial heave was noted [Normal Rate] : normal [Heart Rate ___] : [unfilled] bpm [Rhythm Regular] : regular [Normal S1] : normal S1 [Normal S2] : normal S2 [No Gallop] : no gallop heard [II] : a grade 2 [2+] : left 2+ [No Abnormalities] : the abdominal aorta was not enlarged and no bruit was heard [No Pitting Edema] : no pitting edema present [FreeTextEntry1] : Has anxiety  [S3] : no S3 [S4] : no S4 [Right Carotid Bruit] : no bruit heard over the right carotid [Left Carotid Bruit] : no bruit heard over the left carotid [Right Femoral Bruit] : no bruit heard over the right femoral artery [Left Femoral Bruit] : no bruit heard over the left femoral artery

## 2021-12-23 ENCOUNTER — NON-APPOINTMENT (OUTPATIENT)
Age: 71
End: 2021-12-23

## 2021-12-23 ENCOUNTER — APPOINTMENT (OUTPATIENT)
Dept: PULMONOLOGY | Facility: CLINIC | Age: 71
End: 2021-12-23
Payer: MEDICARE

## 2021-12-23 VITALS
HEIGHT: 67 IN | WEIGHT: 179 LBS | SYSTOLIC BLOOD PRESSURE: 118 MMHG | TEMPERATURE: 97.8 F | RESPIRATION RATE: 16 BRPM | BODY MASS INDEX: 28.09 KG/M2 | HEART RATE: 74 BPM | DIASTOLIC BLOOD PRESSURE: 74 MMHG | OXYGEN SATURATION: 98 %

## 2021-12-23 DIAGNOSIS — I73.9 PERIPHERAL VASCULAR DISEASE, UNSPECIFIED: ICD-10-CM

## 2021-12-23 DIAGNOSIS — E66.3 OVERWEIGHT: ICD-10-CM

## 2021-12-23 PROCEDURE — 95012 NITRIC OXIDE EXP GAS DETER: CPT

## 2021-12-23 PROCEDURE — 94010 BREATHING CAPACITY TEST: CPT

## 2021-12-23 PROCEDURE — 99214 OFFICE O/P EST MOD 30 MIN: CPT | Mod: 25

## 2021-12-23 NOTE — PROCEDURE
[FreeTextEntry1] : PFT reveals mild restrictive flows, with an FEV1 of  2.15L, which is  75% of predicted, with normal flow volume loop\par \par FENO was 25; normal value being less than 25\par Fractional exhaled nitric oxide (FENO) is regarded as a simple, noninvasive method for assessing eosinophilic airway inflammation. Produced by a variety of cells within the lung, nitric oxide (NO) concentrations are generally low in healthy individuals. However, high concentrations of NO appear to be involved in nonspecific host defense mechanisms and chronic inflammatory diseases such as asthma. The American Thor[employer] Society (ATS) therefore has recommended using FENO to aid in the diagnosis and monitoring of eosinophilic airway inflammation and asthma, and for identifying steroid responsive individuals whose chronic respiratory symptoms may be airway inflammation.

## 2021-12-23 NOTE — ADDENDUM
[FreeTextEntry1] : Documented by Suzi Brunson acting as a scribe for Dr. Dexter Duran on 12/23/2021 \par \par All medical record entries made by the Scribe were at my, Dr. Dexter Duran's, direction and personally dictated by me on 12/23/2021 . I have reviewed the chart and agree that the record accurately reflects my personal performance of the history, physical exam, assessment and plan. I have also personally directed, reviewed, and agree with the discharge instructions.

## 2021-12-23 NOTE — HISTORY OF PRESENT ILLNESS
[FreeTextEntry1] : Mr. TADEO is a 71 year old male with a history of  elevated PSA, HTN, elevated HLD, mitral valve prolapse, occupational exposure in the workplace (9/11) non-smoker who now comes to the office for a follow up pulmonary evaluation. His chief complaint is\par - he notes he has been having hand issues \par - he has been having left lower extremity leg pain 9/10 \par - he notes he has already had vein bypasses \par - he notes right now his foot his swollen due to his leg pain \par - no heart burn / reflux \par - no sour taste in the mouth\par - no palpitations\par - he has not been getting enough sleep, only 4-5 hrs a night \par - no coughing / wheezing \par - he notes he does not have stamina\par - he notes he gets SOB on exertion \par - no palpitations \par - He  denies any visual issues, headaches, nausea, vomiting, fever, chills, sweats, chest pains, chest pressure, diarrhea, constipation, dysphagia, myalgia, dizziness, leg swelling, leg pain, itchy eyes, itchy ears, heartburn, reflux, or sour taste in the mouth.

## 2021-12-23 NOTE — PHYSICAL EXAM
[General Appearance - Well Developed] : well developed [Normal Appearance] : normal appearance [Well Groomed] : well groomed [General Appearance - Well Nourished] : well nourished [No Deformities] : no deformities [General Appearance - In No Acute Distress] : no acute distress [Normal Conjunctiva] : the conjunctiva exhibited no abnormalities [Eyelids - No Xanthelasma] : the eyelids demonstrated no xanthelasmas [Normal Oropharynx] : normal oropharynx [III] : III [Neck Appearance] : the appearance of the neck was normal [Neck Cervical Mass (___cm)] : no neck mass was observed [Jugular Venous Distention Increased] : there was no jugular-venous distention [Thyroid Diffuse Enlargement] : the thyroid was not enlarged [Thyroid Nodule] : there were no palpable thyroid nodules [Heart Rate And Rhythm] : heart rate and rhythm were normal [Heart Sounds] : normal S1 and S2 [Murmurs] : no murmurs present [Respiration, Rhythm And Depth] : normal respiratory rhythm and effort [Exaggerated Use Of Accessory Muscles For Inspiration] : no accessory muscle use [Auscultation Breath Sounds / Voice Sounds] : lungs were clear to auscultation bilaterally [Abdomen Soft] : soft [Abdomen Tenderness] : non-tender [Abdomen Mass (___ Cm)] : no abdominal mass palpated [Abnormal Walk] : normal gait [Gait - Sufficient For Exercise Testing] : the gait was sufficient for exercise testing [Nail Clubbing] : no clubbing of the fingernails [Cyanosis, Localized] : no localized cyanosis [Petechial Hemorrhages (___cm)] : no petechial hemorrhages [Skin Color & Pigmentation] : normal skin color and pigmentation [Skin Turgor] : normal skin turgor [] : no rash [Deep Tendon Reflexes (DTR)] : deep tendon reflexes were 2+ and symmetric [Sensation] : the sensory exam was normal to light touch and pinprick [No Focal Deficits] : no focal deficits [Oriented To Time, Place, And Person] : oriented to person, place, and time [Impaired Insight] : insight and judgment were intact [Affect] : the affect was normal [FreeTextEntry1] : 1+ LE edema on the right

## 2022-01-19 ENCOUNTER — APPOINTMENT (OUTPATIENT)
Dept: VASCULAR SURGERY | Facility: CLINIC | Age: 72
End: 2022-01-19

## 2022-04-08 ENCOUNTER — LABORATORY RESULT (OUTPATIENT)
Age: 72
End: 2022-04-08

## 2022-04-08 ENCOUNTER — APPOINTMENT (OUTPATIENT)
Dept: CARDIOLOGY | Facility: CLINIC | Age: 72
End: 2022-04-08
Payer: MEDICARE

## 2022-04-08 ENCOUNTER — NON-APPOINTMENT (OUTPATIENT)
Age: 72
End: 2022-04-08

## 2022-04-08 VITALS
HEART RATE: 63 BPM | HEIGHT: 67 IN | DIASTOLIC BLOOD PRESSURE: 74 MMHG | WEIGHT: 188 LBS | OXYGEN SATURATION: 97 % | SYSTOLIC BLOOD PRESSURE: 110 MMHG | TEMPERATURE: 98 F | BODY MASS INDEX: 29.51 KG/M2 | RESPIRATION RATE: 16 BRPM

## 2022-04-08 PROCEDURE — 93000 ELECTROCARDIOGRAM COMPLETE: CPT | Mod: NC

## 2022-04-08 PROCEDURE — 93306 TTE W/DOPPLER COMPLETE: CPT

## 2022-04-08 PROCEDURE — 99214 OFFICE O/P EST MOD 30 MIN: CPT | Mod: 25

## 2022-04-08 NOTE — PHYSICAL EXAM
[Well Developed] : well developed [Well Nourished] : well nourished [No Acute Distress] : no acute distress [Normal Venous Pressure] : normal venous pressure [No Carotid Bruit] : no carotid bruit [Normal S1, S2] : normal S1, S2 [No Murmur] : no murmur [No Rub] : no rub [Clear Lung Fields] : clear lung fields [Good Air Entry] : good air entry [No Respiratory Distress] : no respiratory distress  [Soft] : abdomen soft [Non Tender] : non-tender [No Masses/organomegaly] : no masses/organomegaly [Normal Bowel Sounds] : normal bowel sounds [Normal Gait] : normal gait [No Edema] : no edema [No Cyanosis] : no cyanosis [No Clubbing] : no clubbing [No Varicosities] : no varicosities [No Rash] : no rash [No Skin Lesions] : no skin lesions [Moves all extremities] : moves all extremities [No Focal Deficits] : no focal deficits [Normal Speech] : normal speech [Alert and Oriented] : alert and oriented [Normal memory] : normal memory [General Appearance - Well Developed] : well developed [Normal Appearance] : normal appearance [Well Groomed] : well groomed [General Appearance - Well Nourished] : well nourished [No Deformities] : no deformities [General Appearance - In No Acute Distress] : no acute distress [Normal Conjunctiva] : the conjunctiva exhibited no abnormalities [Normal Oral Mucosa] : normal oral mucosa [Normal Oropharynx] : normal oropharynx [Normal Jugular Venous A Waves Present] : normal jugular venous A waves present [Normal Jugular Venous V Waves Present] : normal jugular venous V waves present [No Jugular Venous Farris A Waves] : no jugular venous farris A waves [Respiration, Rhythm And Depth] : normal respiratory rhythm and effort [Exaggerated Use Of Accessory Muscles For Inspiration] : no accessory muscle use [Auscultation Breath Sounds / Voice Sounds] : lungs were clear to auscultation bilaterally [Bowel Sounds] : normal bowel sounds [Abdomen Soft] : soft [Abnormal Walk] : normal gait [Gait - Sufficient For Exercise Testing] : the gait was sufficient for exercise testing [Nail Clubbing] : no clubbing of the fingernails [Cyanosis, Localized] : no localized cyanosis [Skin Color & Pigmentation] : normal skin color and pigmentation [Skin Turgor] : normal skin turgor [] : no rash [Oriented To Time, Place, And Person] : oriented to person, place, and time [Affect] : the affect was normal [Mood] : the mood was normal [5th Left ICS - MCL] : palpated at the 5th LICS in the midclavicular line [Normal] : normal [No Precordial Heave] : no precordial heave was noted [Normal Rate] : normal [Heart Rate ___] : [unfilled] bpm [Rhythm Regular] : regular [Normal S1] : normal S1 [Normal S2] : normal S2 [No Gallop] : no gallop heard [II] : a grade 2 [2+] : left 2+ [No Abnormalities] : the abdominal aorta was not enlarged and no bruit was heard [No Pitting Edema] : no pitting edema present [FreeTextEntry1] : Has anxiety  [S3] : no S3 [S4] : no S4 [Right Carotid Bruit] : no bruit heard over the right carotid [Left Carotid Bruit] : no bruit heard over the left carotid [Right Femoral Bruit] : no bruit heard over the right femoral artery [Left Femoral Bruit] : no bruit heard over the left femoral artery

## 2022-04-08 NOTE — CARDIOLOGY SUMMARY
[___] : [unfilled] [LVEF ___%] : LVEF [unfilled]% [de-identified] : 4/8/22 [de-identified] : 2019 [de-identified] : 11/30/2020

## 2022-04-08 NOTE — ASSESSMENT
[FreeTextEntry1] : This is a 71 year year old male here today for follow up cardiac evaluation. \par He has a past medical history significant for anxiety, hypertension, status post bilateral hernia repair, status post bilateral varicose vein surgery.\par \par Today he is feeling generally well from a cardiac standpoint. He follows with his pulmonologist Dr. Duran for management of his Asthma and is so far feeling well today. He is here today for cardiac clearance because he is having dental work and needs to be put under IV anesthesia (due to nerves).\par \par -He remains on Bystolic 10mg PO DAILY, Zetia 10mg  PO Daily and Telmisartan HCTZ 80/25mg PO DAILY.\par \par -Cardiac risk factors include HTN and HLD.\par \par BLOOD PRESSURE:\par -BP is well controlled in today's visit and patient is on Bystolic 10mg PO DAILY and Telmisartan HCTZ 80/25mg PO DAILY.\par \par BLOOD WORK:\par -New blood work was done today 4/8/22 to evaluate lipid profile, CBC, BMP, hepatic function, A1C and TSH. \par Pt currently taking Zetia 10mg  PO Daily.\par -He states that he had leg cramps and soreness while on his Atorvastatin in the past.\par \par TESTING:\par -EKG done today 04/08/2022 which demonstrated regular sinus rhythm with nonspecific ST-T wave changes BPM of 63.\par \par -EKG done 10/15/2021 which demonstrated regular sinus rhythm with nonspecific ST-T wave changes, BPM of 51.\par \par -Echocardiogram done in the office 4/8/22 and results are pending.\par \par -Echocardiogram done on 11/30/2020 demonstrated mild-moderate MR, mild tricuspid and pulmonic regurgitation, thickened aortic valve, thickened mitral valve leaflets and bileaflet MVP with normal left ventricular systolic function. EF of 65%.\par \par PLAN:\par -The patient is clear from a cardiac standpoint for his dental procedure.  The patient should be allowed to take their usual medications in the perioperative period. He is not on any blood thinners at this time.\par -He will continue with his usual medications and will contact the office if he is having any complaints between now and their next follow up appointment.\par \par I have discussed the plan of care with . TERI TADEO  and he  will follow up in 3 months. He is compliant with all of his  medications.\par \par The patient understands that aerobic exercises must be increased to minutes 4 times/week and a detailed discussion of lifestyle modification was done today. \par The patient has a good understanding of the diagnosis, treatment plan and lifestyle modification. \par He will contact me at the office for any questions with their care or any changes in their health status.\par \par The plan of care was discussed with supervising physician, Dr. Pearson while present in the office at the time of the visit. \par \par Adriana DUFFY

## 2022-04-11 ENCOUNTER — APPOINTMENT (OUTPATIENT)
Dept: PULMONOLOGY | Facility: CLINIC | Age: 72
End: 2022-04-11

## 2022-04-11 ENCOUNTER — NON-APPOINTMENT (OUTPATIENT)
Age: 72
End: 2022-04-11

## 2022-04-11 VITALS
TEMPERATURE: 97.3 F | WEIGHT: 185 LBS | HEIGHT: 69 IN | RESPIRATION RATE: 16 BRPM | DIASTOLIC BLOOD PRESSURE: 82 MMHG | SYSTOLIC BLOOD PRESSURE: 120 MMHG | BODY MASS INDEX: 27.4 KG/M2 | HEART RATE: 63 BPM | OXYGEN SATURATION: 98 %

## 2022-04-11 NOTE — PHYSICAL EXAM
[No Acute Distress] : no acute distress [Well Nourished] : well nourished [No Deformities] : no deformities [Well Developed] : well developed [Normal Appearance] : normal appearance [No Neck Mass] : no neck mass [Normal Rate/Rhythm] : normal rate/rhythm [Normal S1, S2] : normal s1, s2 [No Murmurs] : no murmurs [No Resp Distress] : no resp distress [Clear to Auscultation Bilaterally] : clear to auscultation bilaterally [No Abnormalities] : no abnormalities [Benign] : benign [Normal Gait] : normal gait [No Clubbing] : no clubbing [No Cyanosis] : no cyanosis [No Edema] : no edema [FROM] : FROM [Normal Color/ Pigmentation] : normal color/ pigmentation [No Focal Deficits] : no focal deficits [Oriented x3] : oriented x3 [Normal Affect] : normal affect
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
## 2022-04-19 ENCOUNTER — NON-APPOINTMENT (OUTPATIENT)
Age: 72
End: 2022-04-19

## 2022-04-19 ENCOUNTER — APPOINTMENT (OUTPATIENT)
Dept: CARDIOLOGY | Facility: CLINIC | Age: 72
End: 2022-04-19

## 2022-04-27 ENCOUNTER — NON-APPOINTMENT (OUTPATIENT)
Age: 72
End: 2022-04-27

## 2022-04-27 ENCOUNTER — APPOINTMENT (OUTPATIENT)
Dept: PULMONOLOGY | Facility: CLINIC | Age: 72
End: 2022-04-27
Payer: MEDICARE

## 2022-04-27 VITALS
RESPIRATION RATE: 16 BRPM | TEMPERATURE: 97.3 F | DIASTOLIC BLOOD PRESSURE: 80 MMHG | WEIGHT: 185 LBS | HEIGHT: 69 IN | OXYGEN SATURATION: 98 % | BODY MASS INDEX: 27.4 KG/M2 | HEART RATE: 68 BPM | SYSTOLIC BLOOD PRESSURE: 110 MMHG

## 2022-04-27 DIAGNOSIS — Z71.89 OTHER SPECIFIED COUNSELING: ICD-10-CM

## 2022-04-27 DIAGNOSIS — J98.4 EMPHYSEMA, UNSPECIFIED: ICD-10-CM

## 2022-04-27 DIAGNOSIS — R06.02 SHORTNESS OF BREATH: ICD-10-CM

## 2022-04-27 DIAGNOSIS — J45.909 UNSPECIFIED ASTHMA, UNCOMPLICATED: ICD-10-CM

## 2022-04-27 DIAGNOSIS — J82.83 EOSINOPHILIC ASTHMA: ICD-10-CM

## 2022-04-27 DIAGNOSIS — R06.83 SNORING: ICD-10-CM

## 2022-04-27 DIAGNOSIS — J43.9 EMPHYSEMA, UNSPECIFIED: ICD-10-CM

## 2022-04-27 PROCEDURE — 95012 NITRIC OXIDE EXP GAS DETER: CPT

## 2022-04-27 PROCEDURE — 99214 OFFICE O/P EST MOD 30 MIN: CPT | Mod: 25

## 2022-04-27 PROCEDURE — 94010 BREATHING CAPACITY TEST: CPT

## 2022-04-27 NOTE — ASSESSMENT
[FreeTextEntry1] : Mr. TADEO is a 71 year old male with a history of elevated PSA, HTN, elevated HLD, mitral valve prolapse, occupational exposure in the workplace (9/11) non-smoker who now comes to the office for a follow up pulmonary evaluation - specifically for SOB / cough / (+) OSAS, likely asthma / eosinophilia / allergy. His #3 issue is hunger,  #2 issue is fatigue, and #3 is left leg issues \par \par His shortness of breath is multifactorial due to:\par -poor mechanics of breathing\par -out of shape / overweight\par -pulmonary disease\par      -  ? Asthma, (+) MEET\par -cardiac disease (Lili)\par \par problem 1:  cardiac disease\par -recommended to continue to follow up with Cardiologist (Dr. Rick Pearson) (notes reviewed)\par \par problem 2 : poor breathing mechanics\par -recommended breathing techniques Morgan Maldonado \par -Proper breathing techniques were reviewed with an emphasis of exhalation. Patient instructed to breath in for\par 1 second and out for four seconds. Patient was encouraged to not talk while walking.\par \par problem 3 : out of shape / overweight\par -Weight loss, exercise, and diet control were discussed and are highly encouraged. Treatment options were\par given such as, aqua therapy, and contacting a nutritionist. Recommended to use the elliptical, stationary bike,\par less use of treadmill. Mindful eating was explained to the patient Obesity is associated with worsening asthma,\par shortness of breath, and potential for cardiac disease, diabetes, and other underlying medical conditions\par \par Problem 4: R/o Asthma (likely) -stable, s/p 9/11\par - Does not qualify for a methacholine challenge based on restrictive dysfunction - abnormal PFTs\par -continue Trelegy 100 1 puff QD (likely asthma) \par -s/p blood work to include: IgE level (-), eosinophil level (+), vitamin D level (-), food IgE level (-), and asthma profile  (+)\par Asthma is believed to be caused by inherited (genetic) and environmental factor, but its exact cause is unknown. Asthma may be triggered by allergens, lung infections, or irritants in the air. Asthma triggers are different for each person\par \par Problem 5 Occupational exposure in the workplace s/p 9/11\par - Regular follow-up going forward \par \par Problem 6: (+) (mild)MEET  - Excite Trial \par - Recommend Home sleep study (repeated 2/3/2020)\par -Recommended to get a dental device (Danoff)\par -s/p blood work to include : free and total testosterone, Iron studies, Ferritin level, &amp; complete thyroid function testing. (all negative)\par -recommended Slumber Bump\par -recommended Somnifix\par -recommended OxyAid/ NasalAid\par \par Sleep apnea is associated with adverse clinical consequences which can affect most organ systems.\par Cardiovascular disease risk includes arrhythmias, atrial fibrillation, hypertension, coronary artery disease,\par and stroke. Metabolic disorders include diabetes type 2, non-alcoholic fatty liver disease. Mood disorder\par especially depression; and cognitive decline especially in the elderly. Associations with chronic\par reflux/Rodriguez’s esophagus some but not all inclusive.\par -Reasons include arousal consistent with hypopnea; respiratory events most prominent in REM sleep or\par supine position; therefore sleep staging and body position are important for accurate diagnosis and\par estimation of AHI.\par \par Problem 7: Allergy \par -continue Claritin 10 mg QD, PRN\par Environmental measures for allergies were encouraged including mattress and pillow cover, air purifier, and environmental controls. \par \par Problem 8: Health Maintenance/COVID19 Precautions:/ ? s/p COVID 19 infection (?when) - 3x vaccines \par -s/p Pfizer COVID 19 vaccine x 3\par - Clean your hands often. Wash your hands often with soap and water for at least 20 seconds, especially after blowing your nose, coughing, or sneezing, or having been in a public place.\par - If soap and water are not available, use a hand  that contains at least 60% alcohol.\par - To the extent possible, avoid touching high-touch surfaces in public places - elevator buttons, door handles, handrails, handshaking with people, etc. Use a tissue or your sleeve to cover your hand or finger if you must touch something.\par - Wash your hands after touching surfaces in public places.\par - Avoid touching your face, nose, eyes, etc.\par - Clean and disinfect your home to remove germs: practice routine cleaning of frequently touched surfaces (for example: tables, doorknobs, light switches, handles, desks, toilets, faucets, sinks & cell phones)\par - Avoid crowds, especially in poorly ventilated spaces. Your risk of exposure to respiratory viruses like COVID-19 may increase in crowded, closed-in settings with little air circulation if there are people in the crowd who are sick. All patients are recommended to practice social distancing and stay at least 6 feet away from others.\par - Avoid all non-essential travel including plane trips, and especially avoid embarking on cruise ships.\par \par COVID-19 precautionary Immune Support Recommendations:\par -OTC Vitamin C 1000mg BID \par -OTC Quercetin 500mg BID \par -OTC Zinc 50 mg per day \par -OTC Melatonin 5 mg a night \par -OTC Vitamin D 2000mg per day \par -OTC Tonic Water 8oz per day\par -Water 0.5-1 gallon per day\par \par Additional Support Supplements: \par -Liposomal Glutathione 500 mg BID\par -SPM 1 tablet BID \par -Berberine 1000 mg BID  \par - mg BID\par \par problem 9 : health maintenance\par - Neuropathy: recommended [facility] (Dexter Martinez) \par - vascular eval - Dr. Randhawa alChelsy \par -Recommend rheumatologist Dr. Manzano\par - Elias - Feng Barksdale evaluation \par - Colonoscopy and prostate follow up \par -s/p yearly flu shot - 2021 \par -recommended strep pneumonia vaccines: Prevnar-13 vaccine, followed by Pneumo vaccine 23 one year\par following (completed)\par -recommended early intervention for Upper Respiratory Infections (URIs)\par -recommended regular osteoporosis evaluations\par -recommended early dermatological evaluations\par -recommended after the age of 50 to the age of 70, colonoscopy every 5 years\par \par F/U in 3-4 months with [facility] \par He is encouraged to call with any changes, concerns, or questions\par

## 2022-04-27 NOTE — ADDENDUM
[FreeTextEntry1] : Documented by Suzi Brunson acting as a scribe for Dr. Dexter uDran on (04/27/2022).\par \par All medical record entries made by the Scribe were at my, Dr. Dexter Duran's, direction and personally dictated by me on (04/27/2022). I have reviewed the chart and agree that the record accurately reflects my personal performance of the history, physical exam, assessment and plan. I have also personally directed, reviewed, and agree with the discharge instructions.\par   soft

## 2022-04-27 NOTE — PROCEDURE
[FreeTextEntry1] : PFT revealed mild restrictive flows, with a FEV1 of 2.36L, which is 75% of predicted, with a normal flow volume loop\par  \par Feno was 25 ; a normal value being less than 25. Fractional exhaled nitric oxide (FENO) is regarded as a simple, noninvasive method for assessing eosinophilic airway inflammation. Produced by a variety of cells within the lung, nitric oxide (NO) concentrations are generally low in healthy individuals. However, high concentrations of NO appear to be involved in nonspecific host defense mechanisms and chronic inflammatory  diseases such as asthma. The American Thoracic Society (ATS) therefore recommended using FENO to aid in the diagnosis and monitoring of eosinophilic airway inflammation and asthma, and for identifying steroid responsive individuals whose chronic respiratory symptoms may be caused by airway inflammation

## 2022-04-27 NOTE — PHYSICAL EXAM
[Normal Appearance] : normal appearance [General Appearance - Well Developed] : well developed [Well Groomed] : well groomed [General Appearance - Well Nourished] : well nourished [No Deformities] : no deformities [General Appearance - In No Acute Distress] : no acute distress [Normal Conjunctiva] : the conjunctiva exhibited no abnormalities [Eyelids - No Xanthelasma] : the eyelids demonstrated no xanthelasmas [Normal Oropharynx] : normal oropharynx [Neck Appearance] : the appearance of the neck was normal [Neck Cervical Mass (___cm)] : no neck mass was observed [Jugular Venous Distention Increased] : there was no jugular-venous distention [Thyroid Diffuse Enlargement] : the thyroid was not enlarged [Thyroid Nodule] : there were no palpable thyroid nodules [Heart Rate And Rhythm] : heart rate and rhythm were normal [Heart Sounds] : normal S1 and S2 [Murmurs] : no murmurs present [Respiration, Rhythm And Depth] : normal respiratory rhythm and effort [Exaggerated Use Of Accessory Muscles For Inspiration] : no accessory muscle use [Auscultation Breath Sounds / Voice Sounds] : lungs were clear to auscultation bilaterally [Abdomen Soft] : soft [Abdomen Tenderness] : non-tender [Abdomen Mass (___ Cm)] : no abdominal mass palpated [Abnormal Walk] : normal gait [Gait - Sufficient For Exercise Testing] : the gait was sufficient for exercise testing [Cyanosis, Localized] : no localized cyanosis [Nail Clubbing] : no clubbing of the fingernails [Petechial Hemorrhages (___cm)] : no petechial hemorrhages [Skin Color & Pigmentation] : normal skin color and pigmentation [Skin Turgor] : normal skin turgor [] : no rash [Deep Tendon Reflexes (DTR)] : deep tendon reflexes were 2+ and symmetric [No Focal Deficits] : no focal deficits [Sensation] : the sensory exam was normal to light touch and pinprick [Oriented To Time, Place, And Person] : oriented to person, place, and time [Impaired Insight] : insight and judgment were intact [Affect] : the affect was normal [II] : II [FreeTextEntry1] : 1+ LE edema

## 2022-04-27 NOTE — REASON FOR VISIT
[Follow-Up] : a follow-up visit [FreeTextEntry1] : occupational exposure in the workplace (9/11) and SOB, poor sleep / (+)OSAS

## 2022-04-27 NOTE — HISTORY OF PRESENT ILLNESS
[FreeTextEntry1] : Mr. TADEO is a 71 year old male with a history of  elevated PSA, HTN, elevated HLD, mitral valve prolapse, occupational exposure in the workplace (9/11) non-smoker who now comes to the office for a follow up pulmonary evaluation. His chief complaint is\par - he will be seeing his surgeon, he will be getting an evaluation. \par - he has been feeling some swelling \par - he had been having some pain in the legs, he has been going to a neurologist. \par - he had been having excruciating pain from his hip to his foot. He was given gabapentin which has not helped. \par - right now he has no pain in the legs. \par - he notes his energy levels are low. During the day he gets extreme fatigue and hunger. \par - he has been eating cake / sweets throughout the day \par - he notes he has interrupted sleep, he wakes up to feed the cats \par - he believes he has more fatigue than he should have. \par - his bowels have been good \par - he notes he has been SOB upon minor exertion sometimes \par - he notes his hands some are in pain at times\par - s/p COVID-19 vaccine x3 \par - patient denies any headaches, nausea, vomiting, fever, chills, sweats, chest pain, chest pressure, palpitations, coughing, wheezing, fatigue, diarrhea, constipation, dysphagia, myalgias, dizziness, leg swelling, leg pain, itchy eyes, itchy ears, heartburn, reflux or sour taste in the mouth

## 2022-05-11 ENCOUNTER — APPOINTMENT (OUTPATIENT)
Dept: VASCULAR SURGERY | Facility: CLINIC | Age: 72
End: 2022-05-11
Payer: MEDICARE

## 2022-05-11 VITALS
HEART RATE: 70 BPM | BODY MASS INDEX: 27.4 KG/M2 | SYSTOLIC BLOOD PRESSURE: 129 MMHG | WEIGHT: 185 LBS | DIASTOLIC BLOOD PRESSURE: 79 MMHG | TEMPERATURE: 97 F | HEIGHT: 69 IN

## 2022-05-11 PROCEDURE — 99203 OFFICE O/P NEW LOW 30 MIN: CPT

## 2022-05-11 PROCEDURE — 93970 EXTREMITY STUDY: CPT

## 2022-05-11 NOTE — ASSESSMENT
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

## 2022-05-11 NOTE — PHYSICAL EXAM
[Normal Breath Sounds] : Normal breath sounds [Normal Heart Sounds] : normal heart sounds [2+] : left 2+ [Ankle Swelling (On Exam)] : present [Ankle Swelling Bilaterally] : bilaterally  [Varicose Veins Of Lower Extremities] : bilaterally [Ankle Swelling On The Right] : mild [No Rash or Lesion] : No rash or lesion [Calm] : calm [] : not present [de-identified] : NAD

## 2022-05-11 NOTE — HISTORY OF PRESENT ILLNESS
[FreeTextEntry1] : 72M presents with bilateral lower extremity swelling. Symptoms have been happening for years. He previously had venous ablations, he is not sure which veins, but he does not feel like they helped him. He thinks that his symptoms are intermittent and cannot note anything that makes them better or worse.

## 2022-08-31 ENCOUNTER — APPOINTMENT (OUTPATIENT)
Dept: PULMONOLOGY | Facility: CLINIC | Age: 72
End: 2022-08-31

## 2022-09-14 ENCOUNTER — APPOINTMENT (OUTPATIENT)
Dept: CARDIOLOGY | Facility: CLINIC | Age: 72
End: 2022-09-14

## 2022-11-02 ENCOUNTER — LABORATORY RESULT (OUTPATIENT)
Age: 72
End: 2022-11-02

## 2022-11-02 ENCOUNTER — APPOINTMENT (OUTPATIENT)
Dept: CARDIOLOGY | Facility: CLINIC | Age: 72
End: 2022-11-02

## 2022-11-02 ENCOUNTER — NON-APPOINTMENT (OUTPATIENT)
Age: 72
End: 2022-11-02

## 2022-11-02 VITALS
HEART RATE: 72 BPM | TEMPERATURE: 98.2 F | HEIGHT: 69 IN | SYSTOLIC BLOOD PRESSURE: 124 MMHG | OXYGEN SATURATION: 98 % | BODY MASS INDEX: 26.36 KG/M2 | WEIGHT: 178 LBS | RESPIRATION RATE: 16 BRPM | DIASTOLIC BLOOD PRESSURE: 76 MMHG

## 2022-11-02 DIAGNOSIS — Z01.810 ENCOUNTER FOR PREPROCEDURAL CARDIOVASCULAR EXAMINATION: ICD-10-CM

## 2022-11-02 PROCEDURE — 93000 ELECTROCARDIOGRAM COMPLETE: CPT | Mod: NC

## 2022-11-02 PROCEDURE — 99214 OFFICE O/P EST MOD 30 MIN: CPT | Mod: 25

## 2022-11-02 NOTE — ASSESSMENT
[FreeTextEntry1] : This is a 71 year year old male here today for follow up cardiac evaluation. \par He has a past medical history significant for anxiety, hypertension, status post bilateral hernia repair, status post bilateral varicose vein surgery.\par \par Today he is feeling generally well from a cardiac standpoint. He follows with his pulmonologist Dr. Duran for management of his Asthma and is so far feeling well today. He is here today for cardiac clearance because he is having a prostate biopsy scheduled for November 17, 2022 by Dr. Holland Galvez.  At [facility].  The fax number is [phone].\par \par He is currently prescribed Bystolic 10 mg daily, ezetimibe 10 mg daily and telmisartan hydrochlorothiazide 80-25 mg daily for blood pressure management.\par \par -Cardiac risk factors include HTN and HLD.\par \par BLOOD PRESSURE:\par -BP is well controlled in today's visit.\par \par BLOOD WORK:\par -New blood work was done 11/02/2022  to evaluate lipid profile, CBC, BMP, hepatic function, A1C and TSH. \par -New blood work was done 4/8/22 demonstrated values WDL.\par \par TESTING:\par -EKG done 11/02/2022 which demonstrated regular sinus rhythm with nonspecific ST-T wave changes BPM of 56.\par \par -Echocardiogram done in 4/8/22 demonstrated EF of 70% with normal left ventricular systolic function.  Bileaflet mitral valve prolapse with mild to moderate mitral regurgitation, mild concentric left ventricular hypertrophy, mild tricuspid and pulmonic regurgitation.\par \par -EKG done 04/08/2022 which demonstrated regular sinus rhythm with nonspecific ST-T wave changes BPM of 63.\par \par -EKG done 10/15/2021 which demonstrated regular sinus rhythm with nonspecific ST-T wave changes, BPM of 51.\par \par -Echocardiogram done on 11/30/2020 demonstrated mild-moderate MR, mild tricuspid and pulmonic regurgitation, thickened aortic valve, thickened mitral valve leaflets and bileaflet MVP with normal left ventricular systolic function. EF of 65%.\par \par PLAN:\par -The patient is clear from a cardiac standpoint for his prostate biopsy. The patient should be allowed to take their usual medications in the perioperative period. He is not on any blood thinners at this time.\par -He will continue with his usual medications and will contact the office if he is having any complaints between now and their next follow up appointment.\par -The patient will schedule an Exercise Stress Test rule out significant coronary artery disease.\par \par I have discussed the plan of care with Mr. TERI TADEO  and he  will follow up in 3 months. He is compliant with all of his  medications.\par \par The patient understands that aerobic exercises must be increased to minutes 4 times/week and a detailed discussion of lifestyle modification was done today. \par The patient has a good understanding of the diagnosis, treatment plan and lifestyle modification. \par He will contact me at the office for any questions with their care or any changes in their health status.\par \par The plan of care was discussed with supervising physician, Dr. Pearson while present in the office at the time of the visit. \par \par Adriana DUFFY

## 2022-11-02 NOTE — CARDIOLOGY SUMMARY
[___] : [unfilled] [LVEF ___%] : LVEF [unfilled]% [de-identified] : 4/8/22 [de-identified] : 2019 [de-identified] : 11/30/2020

## 2022-11-07 RX ORDER — FLUTICASONE FUROATE, UMECLIDINIUM BROMIDE AND VILANTEROL TRIFENATATE 100; 62.5; 25 UG/1; UG/1; UG/1
100-62.5-25 POWDER RESPIRATORY (INHALATION)
Qty: 3 | Refills: 1 | Status: COMPLETED | COMMUNITY
Start: 2020-03-16 | End: 2022-11-07

## 2023-03-20 ENCOUNTER — NON-APPOINTMENT (OUTPATIENT)
Age: 73
End: 2023-03-20

## 2023-05-02 ENCOUNTER — APPOINTMENT (OUTPATIENT)
Dept: CARDIOLOGY | Facility: CLINIC | Age: 73
End: 2023-05-02

## 2023-05-02 ENCOUNTER — NON-APPOINTMENT (OUTPATIENT)
Age: 73
End: 2023-05-02

## 2023-05-02 ENCOUNTER — APPOINTMENT (OUTPATIENT)
Dept: CARDIOLOGY | Facility: CLINIC | Age: 73
End: 2023-05-02
Payer: MEDICARE

## 2023-05-02 VITALS
SYSTOLIC BLOOD PRESSURE: 118 MMHG | HEART RATE: 63 BPM | HEIGHT: 69 IN | TEMPERATURE: 97.9 F | OXYGEN SATURATION: 97 % | DIASTOLIC BLOOD PRESSURE: 72 MMHG | WEIGHT: 187 LBS | RESPIRATION RATE: 16 BRPM | BODY MASS INDEX: 27.7 KG/M2

## 2023-05-02 DIAGNOSIS — R06.09 OTHER FORMS OF DYSPNEA: ICD-10-CM

## 2023-05-02 DIAGNOSIS — Z78.9 OTHER SPECIFIED HEALTH STATUS: ICD-10-CM

## 2023-05-02 PROCEDURE — 93000 ELECTROCARDIOGRAM COMPLETE: CPT

## 2023-05-02 PROCEDURE — 99214 OFFICE O/P EST MOD 30 MIN: CPT | Mod: 25

## 2023-05-02 RX ORDER — EZETIMIBE 10 MG/1
10 TABLET ORAL DAILY
Qty: 90 | Refills: 1 | Status: ACTIVE | COMMUNITY
Start: 2020-09-10 | End: 1900-01-01

## 2023-05-02 RX ORDER — NEBIVOLOL HYDROCHLORIDE 10 MG/1
10 TABLET ORAL DAILY
Qty: 90 | Refills: 1 | Status: ACTIVE | COMMUNITY
Start: 2019-09-23 | End: 1900-01-01

## 2023-05-02 NOTE — ASSESSMENT
[FreeTextEntry1] : This is a 72 year year old male here today for follow up cardiac evaluation. \par He has a past medical history significant for anxiety, hypertension, status post bilateral hernia repair, status post bilateral varicose vein surgery.\par \par Today he is feeling generally well from a cardiac standpoint.  He was supposed to be here for an exercise stress test however states he hurt his shoulder while caring for his elderly mother and would like to push off the stress test until his next follow-up appointment.\par \par He follows with his pulmonologist Dr. Duran for management of his Asthma and is so far feeling well today.  \par \par He is s/p prostate biopsy scheduled for November 17, 2022 by Dr. Holland Galvez.  At [facility].  The fax number is [phone] he states that all went well and he is feeling well from a urologic standpoint\par \par He is currently prescribed Bystolic 10 mg daily, ezetimibe 10 mg daily and telmisartan hydrochlorothiazide 80-25 mg daily for blood pressure management.\par \par -Cardiac risk factors include HTN and HLD.\par \par BLOOD PRESSURE:\par -BP is well controlled in today's visit.\par \par BLOOD WORK:\par -New blood work was done 11/02/2022  to evaluate lipid profile, CBC, BMP, hepatic function, A1C and TSH. \par -New blood work was done 4/8/22 demonstrated values WDL.\par \par TESTING:\par -EKG done 11/02/2022 which demonstrated regular sinus rhythm with nonspecific ST-T wave changes BPM of 56.\par \par -Echocardiogram done in 4/8/22 demonstrated EF of 70% with normal left ventricular systolic function.  Bileaflet mitral valve prolapse with mild to moderate mitral regurgitation, mild concentric left ventricular hypertrophy, mild tricuspid and pulmonic regurgitation.\par \par -EKG done 04/08/2022 which demonstrated regular sinus rhythm with nonspecific ST-T wave changes BPM of 63.\par \par -EKG done 10/15/2021 which demonstrated regular sinus rhythm with nonspecific ST-T wave changes, BPM of 51.\par \par -Echocardiogram done on 11/30/2020 demonstrated mild-moderate MR, mild tricuspid and pulmonic regurgitation, thickened aortic valve, thickened mitral valve leaflets and bileaflet MVP with normal left ventricular systolic function. EF of 65%.\par \par PLAN:\par -The patient is clinically stable from a cardiac standpoint on today's exam. \par -He will continue with his usual medications and will contact the office if he is having any complaints between now and their next follow up appointment.\par -The patient will schedule an Exercise Stress Test rule out significant coronary artery disease.\par \par I have discussed the plan of care with Mr. TERI TADEO  and he  will follow up in 4-6 months. He is compliant with all of his  medications.\par \par The patient understands that aerobic exercises must be increased to minutes 4 times/week and a detailed discussion of lifestyle modification was done today. \par The patient has a good understanding of the diagnosis, treatment plan and lifestyle modification. \par He will contact me at the office for any questions with their care or any changes in their health status.\par \par The plan of care was discussed with supervising physician, Dr. Pearson while present in the office at the time of the visit. \par \par Adriana DUFFY

## 2023-05-02 NOTE — PHYSICAL EXAM
[Well Developed] : well developed [Well Nourished] : well nourished [No Acute Distress] : no acute distress [Normal Venous Pressure] : normal venous pressure [No Carotid Bruit] : no carotid bruit [Normal S1, S2] : normal S1, S2 [No Murmur] : no murmur [No Rub] : no rub [Clear Lung Fields] : clear lung fields [Good Air Entry] : good air entry [No Respiratory Distress] : no respiratory distress  [Soft] : abdomen soft [Non Tender] : non-tender [No Masses/organomegaly] : no masses/organomegaly [Normal Bowel Sounds] : normal bowel sounds [Normal Gait] : normal gait [No Edema] : no edema [No Cyanosis] : no cyanosis [No Clubbing] : no clubbing [No Varicosities] : no varicosities [No Skin Lesions] : no skin lesions [No Rash] : no rash [Moves all extremities] : moves all extremities [No Focal Deficits] : no focal deficits [Normal Speech] : normal speech [Alert and Oriented] : alert and oriented [Normal memory] : normal memory [General Appearance - Well Developed] : well developed [Normal Appearance] : normal appearance [Well Groomed] : well groomed [General Appearance - Well Nourished] : well nourished [No Deformities] : no deformities [General Appearance - In No Acute Distress] : no acute distress [Normal Conjunctiva] : the conjunctiva exhibited no abnormalities [Normal Oral Mucosa] : normal oral mucosa [Normal Oropharynx] : normal oropharynx [Normal Jugular Venous A Waves Present] : normal jugular venous A waves present [Normal Jugular Venous V Waves Present] : normal jugular venous V waves present [No Jugular Venous Farris A Waves] : no jugular venous farris A waves [Exaggerated Use Of Accessory Muscles For Inspiration] : no accessory muscle use [Respiration, Rhythm And Depth] : normal respiratory rhythm and effort [Auscultation Breath Sounds / Voice Sounds] : lungs were clear to auscultation bilaterally [Bowel Sounds] : normal bowel sounds [Abdomen Soft] : soft [Abnormal Walk] : normal gait [Gait - Sufficient For Exercise Testing] : the gait was sufficient for exercise testing [Nail Clubbing] : no clubbing of the fingernails [Skin Color & Pigmentation] : normal skin color and pigmentation [Cyanosis, Localized] : no localized cyanosis [Skin Turgor] : normal skin turgor [] : no rash [Affect] : the affect was normal [Oriented To Time, Place, And Person] : oriented to person, place, and time [Mood] : the mood was normal [FreeTextEntry1] : Has anxiety  [5th Left ICS - MCL] : palpated at the 5th LICS in the midclavicular line [Normal] : normal [No Precordial Heave] : no precordial heave was noted [Normal Rate] : normal [Heart Rate ___] : [unfilled] bpm [Rhythm Regular] : regular [Normal S1] : normal S1 [Normal S2] : normal S2 [No Gallop] : no gallop heard [S3] : no S3 [S4] : no S4 [II] : a grade 2 [Right Carotid Bruit] : no bruit heard over the right carotid [Left Carotid Bruit] : no bruit heard over the left carotid [Right Femoral Bruit] : no bruit heard over the right femoral artery [Left Femoral Bruit] : no bruit heard over the left femoral artery [2+] : left 2+ [No Abnormalities] : the abdominal aorta was not enlarged and no bruit was heard [No Pitting Edema] : no pitting edema present

## 2023-05-02 NOTE — CARDIOLOGY SUMMARY
[de-identified] : 4/8/22 [de-identified] : 2019 [de-identified] : 11/30/2020 [___] : [unfilled] [___] : [unfilled] [LVEF ___%] : LVEF [unfilled]%

## 2023-11-07 ENCOUNTER — APPOINTMENT (OUTPATIENT)
Dept: CARDIOLOGY | Facility: CLINIC | Age: 73
End: 2023-11-07

## 2024-03-07 ENCOUNTER — APPOINTMENT (OUTPATIENT)
Dept: ORTHOPEDIC SURGERY | Facility: CLINIC | Age: 74
End: 2024-03-07
Payer: MEDICARE

## 2024-03-07 DIAGNOSIS — M92.212 OSTEOCHONDROSIS (JUVENILE) OF CARPAL LUNATE [KIENBOCK], LEFT HAND: ICD-10-CM

## 2024-03-07 DIAGNOSIS — G56.01 CARPAL TUNNEL SYNDROME, RIGHT UPPER LIMB: ICD-10-CM

## 2024-03-07 PROCEDURE — 20605 DRAIN/INJ JOINT/BURSA W/O US: CPT | Mod: LT

## 2024-03-07 PROCEDURE — 73110 X-RAY EXAM OF WRIST: CPT | Mod: LT

## 2024-03-07 PROCEDURE — 99204 OFFICE O/P NEW MOD 45 MIN: CPT | Mod: 25

## 2024-03-07 RX ORDER — MELOXICAM 15 MG/1
15 TABLET ORAL
Qty: 30 | Refills: 11 | Status: ACTIVE | COMMUNITY
Start: 2024-03-07 | End: 1900-01-01

## 2024-03-10 ENCOUNTER — LABORATORY RESULT (OUTPATIENT)
Age: 74
End: 2024-03-10

## 2024-03-11 ENCOUNTER — APPOINTMENT (OUTPATIENT)
Dept: CARDIOLOGY | Facility: CLINIC | Age: 74
End: 2024-03-11

## 2024-03-11 ENCOUNTER — APPOINTMENT (OUTPATIENT)
Dept: CARDIOLOGY | Facility: CLINIC | Age: 74
End: 2024-03-11
Payer: MEDICARE

## 2024-03-11 VITALS
SYSTOLIC BLOOD PRESSURE: 125 MMHG | RESPIRATION RATE: 16 BRPM | WEIGHT: 182 LBS | TEMPERATURE: 98.6 F | HEART RATE: 65 BPM | BODY MASS INDEX: 26.96 KG/M2 | OXYGEN SATURATION: 95 % | DIASTOLIC BLOOD PRESSURE: 78 MMHG | HEIGHT: 69 IN

## 2024-03-11 VITALS
BODY MASS INDEX: 26.96 KG/M2 | HEART RATE: 65 BPM | RESPIRATION RATE: 16 BRPM | TEMPERATURE: 98.6 F | HEIGHT: 69 IN | OXYGEN SATURATION: 95 % | WEIGHT: 182 LBS

## 2024-03-11 DIAGNOSIS — I34.0 NONRHEUMATIC MITRAL (VALVE) INSUFFICIENCY: ICD-10-CM

## 2024-03-11 DIAGNOSIS — E78.5 HYPERLIPIDEMIA, UNSPECIFIED: ICD-10-CM

## 2024-03-11 DIAGNOSIS — R00.1 BRADYCARDIA, UNSPECIFIED: ICD-10-CM

## 2024-03-11 DIAGNOSIS — I34.1 NONRHEUMATIC MITRAL (VALVE) PROLAPSE: ICD-10-CM

## 2024-03-11 DIAGNOSIS — I10 ESSENTIAL (PRIMARY) HYPERTENSION: ICD-10-CM

## 2024-03-11 DIAGNOSIS — G47.33 OBSTRUCTIVE SLEEP APNEA (ADULT) (PEDIATRIC): ICD-10-CM

## 2024-03-11 PROCEDURE — G2211 COMPLEX E/M VISIT ADD ON: CPT

## 2024-03-11 PROCEDURE — 99214 OFFICE O/P EST MOD 30 MIN: CPT

## 2024-03-11 NOTE — PHYSICAL EXAM
[Well Developed] : well developed [Well Nourished] : well nourished [No Acute Distress] : no acute distress [Normal Venous Pressure] : normal venous pressure [No Carotid Bruit] : no carotid bruit [Normal S1, S2] : normal S1, S2 [No Rub] : no rub [No Murmur] : no murmur [Clear Lung Fields] : clear lung fields [Good Air Entry] : good air entry [No Respiratory Distress] : no respiratory distress  [Soft] : abdomen soft [Non Tender] : non-tender [Normal Bowel Sounds] : normal bowel sounds [No Masses/organomegaly] : no masses/organomegaly [No Edema] : no edema [Normal Gait] : normal gait [No Cyanosis] : no cyanosis [No Clubbing] : no clubbing [No Varicosities] : no varicosities [No Rash] : no rash [Moves all extremities] : moves all extremities [No Skin Lesions] : no skin lesions [No Focal Deficits] : no focal deficits [Alert and Oriented] : alert and oriented [Normal Speech] : normal speech [Normal memory] : normal memory [General Appearance - Well Developed] : well developed [Normal Appearance] : normal appearance [Well Groomed] : well groomed [General Appearance - Well Nourished] : well nourished [No Deformities] : no deformities [General Appearance - In No Acute Distress] : no acute distress [Normal Conjunctiva] : the conjunctiva exhibited no abnormalities [Normal Oral Mucosa] : normal oral mucosa [Normal Oropharynx] : normal oropharynx [Normal Jugular Venous V Waves Present] : normal jugular venous V waves present [Normal Jugular Venous A Waves Present] : normal jugular venous A waves present [No Jugular Venous Farris A Waves] : no jugular venous farris A waves [Respiration, Rhythm And Depth] : normal respiratory rhythm and effort [Exaggerated Use Of Accessory Muscles For Inspiration] : no accessory muscle use [Auscultation Breath Sounds / Voice Sounds] : lungs were clear to auscultation bilaterally [Bowel Sounds] : normal bowel sounds [Abdomen Soft] : soft [Abnormal Walk] : normal gait [Gait - Sufficient For Exercise Testing] : the gait was sufficient for exercise testing [Cyanosis, Localized] : no localized cyanosis [Nail Clubbing] : no clubbing of the fingernails [Skin Color & Pigmentation] : normal skin color and pigmentation [Skin Turgor] : normal skin turgor [] : no rash [Oriented To Time, Place, And Person] : oriented to person, place, and time [Affect] : the affect was normal [Mood] : the mood was normal [5th Left ICS - MCL] : palpated at the 5th LICS in the midclavicular line [Normal] : normal [No Precordial Heave] : no precordial heave was noted [Normal Rate] : normal [Heart Rate ___] : [unfilled] bpm [Rhythm Regular] : regular [Normal S1] : normal S1 [Normal S2] : normal S2 [No Gallop] : no gallop heard [II] : a grade 2 [2+] : left 2+ [No Pitting Edema] : no pitting edema present [No Abnormalities] : the abdominal aorta was not enlarged and no bruit was heard [FreeTextEntry1] : Has anxiety  [S3] : no S3 [S4] : no S4 [Left Carotid Bruit] : no bruit heard over the left carotid [Right Carotid Bruit] : no bruit heard over the right carotid [Left Femoral Bruit] : no bruit heard over the left femoral artery [Right Femoral Bruit] : no bruit heard over the right femoral artery

## 2024-03-11 NOTE — CARDIOLOGY SUMMARY
[___] : [unfilled] [LVEF ___%] : LVEF [unfilled]% [de-identified] : 4/8/22 [de-identified] : 2019 [de-identified] : 11/30/2020

## 2024-03-11 NOTE — ASSESSMENT
[FreeTextEntry1] : Prior note nurse practitioner Nini May 2, 2023::  This is a 72 year year old male here today for follow up cardiac evaluation.  He has a past medical history significant for anxiety, hypertension, status post bilateral hernia repair, status post bilateral varicose vein surgery.  Today he is feeling generally well from a cardiac standpoint.  He was supposed to be here for an exercise stress test however states he hurt his shoulder while caring for his elderly mother and would like to push off the stress test until his next follow-up appointment.  He follows with his pulmonologist Dr. Duran for management of his Asthma and is so far feeling well today.    He is s/p prostate biopsy scheduled for November 17, 2022 by Dr. Holland Galvez.  At [facility].  The fax number is [phone] he states that all went well and he is feeling well from a urologic standpoint  He is currently prescribed Bystolic 10 mg daily, ezetimibe 10 mg daily and telmisartan hydrochlorothiazide 80-25 mg daily for blood pressure management.  -Cardiac risk factors include HTN and HLD.  BLOOD PRESSURE: -BP is well controlled in today's visit.  BLOOD WORK: -New blood work was done 11/02/2022  to evaluate lipid profile, CBC, BMP, hepatic function, A1C and TSH.  -New blood work was done 4/8/22 demonstrated values WDL.  TESTING: -EKG done 11/02/2022 which demonstrated regular sinus rhythm with nonspecific ST-T wave changes BPM of 56.  -Echocardiogram done in 4/8/22 demonstrated EF of 70% with normal left ventricular systolic function.  Bileaflet mitral valve prolapse with mild to moderate mitral regurgitation, mild concentric left ventricular hypertrophy, mild tricuspid and pulmonic regurgitation.  -EKG done 04/08/2022 which demonstrated regular sinus rhythm with nonspecific ST-T wave changes BPM of 63.  -EKG done 10/15/2021 which demonstrated regular sinus rhythm with nonspecific ST-T wave changes, BPM of 51.  -Echocardiogram done on 11/30/2020 demonstrated mild-moderate MR, mild tricuspid and pulmonic regurgitation, thickened aortic valve, thickened mitral valve leaflets and bileaflet MVP with normal left ventricular systolic function. EF of 65%.  PLAN: -The patient is clinically stable from a cardiac standpoint on today's exam.  -He will continue with his usual medications and will contact the office if he is having any complaints between now and their next follow up appointment. -The patient will schedule an Exercise Stress Test rule out significant coronary artery disease.  I have discussed the plan of care with Mr. TERI TADEO  and he  will follow up in 4-6 months. He is compliant with all of his  medications.  The patient understands that aerobic exercises must be increased to minutes 4 times/week and a detailed discussion of lifestyle modification was done today.  The patient has a good understanding of the diagnosis, treatment plan and lifestyle modification.  He will contact me at the office for any questions with their care or any changes in their health status.  The plan of care was discussed with supervising physician, Dr. Pearson while present in the office at the time of the visit.   Adriana DUFFY

## 2024-03-11 NOTE — REVIEW OF SYSTEMS
Jesica Cope is a 81 y.o. male    Chief Complaint   Patient presents with    Follow-up     6 month f/u, PAF    Coronary Artery Disease    Cholesterol Problem       Chest pain No    SOB patient states some SOB     Dizziness patient states some dizziness that comes and goes     Swelling No    Refills zetia    Visit Vitals  /82 (BP 1 Location: Left upper arm, BP Patient Position: Sitting)   Ht 5' 11\" (1.803 m)   Wt 196 lb (88.9 kg)   SpO2 98%   BMI 27.34 kg/m²       1. Have you been to the ER, urgent care clinic since your last visit? Hospitalized since your last visit? No    2. Have you seen or consulted any other health care providers outside of the [address] [place] [address] since your last visit? Include any pap smears or colon screening.   No [Negative] : Heme/Lymph

## 2024-03-11 NOTE — REASON FOR VISIT
[Follow-Up - Clinic] : a clinic follow-up of [Dyspnea] : dyspnea [Hyperlipidemia] : hyperlipidemia [Hypertension] : hypertension [CV Risk Factors and Non-Cardiac Disease] : CV risk factors and non-cardiac disease [FreeTextEntry1] : murmur

## 2024-03-11 NOTE — DISCUSSION/SUMMARY
[FreeTextEntry1] : This is a 73-year-old male with past medical history significant for anxiety, hypertension, status post bilateral hernia repair, status post bilateral varicose vein surgery, who comes in for cardiac follow up evaluation. He denies chest pain, shortness breath, dizziness or syncope.   The patient is scheduled for right carpal tunnel surgery April 2, 2024. He has been stable on his current medications.  His blood pressure is under adequate control. He will have new blood work done today for lipid profile and SMA 20. Echo Doppler examination done April 8, 2022 demonstrated bileaflet mitral valve prolapse with mild to moderate mitral valve regurgitation, mild concentric left ventricular hypertrophy, mild tricuspid valve regurgitation, minimal pulmonic valve regurgitation and satisfactory left ventricular function with estimated ejection fraction 66%. The patient's blood pressure is under good control.  He will continue his current medication including Micardis hydrochlorothiazide 80/25 mg/day, Bystolic 10 mg, and Zetia therapy. Echo Doppler examination done August 7, 2019 which demonstrated mild mitral, tricuspid, and pulmonic valve regurgitation with mitral valve prolapse and thickened aortic valve leaflets.  His estimated ejection fraction is 65%. A lipid panel done June 24, 2019 demonstrated direct LDL cholesterol 147 mg/dL, hemoglobin A1c of 5.6, Restoril 208 mg/dL, triglycerides 34 mg/dL, and HDL direct 69 mg/dL. Electrocardiogram done Hillary 10, 2019 demonstrated normal sinus rhythm at 68 beats per minute is otherwise remarkable for poor R-wave progression. The patient had a normal exercise stress test August 6, 2019.  He has no history of rheumatic fever.  He does not drink excessive caffeine or alcohol.  He has no known allergies. The patient understands that aerobic exercises must be increased to 40 minutes 4 times per week. A detailed discussion of lifestyle modification was done today. The patient has a good understanding of the diagnosis, and treatment plan. Lifestyle modification was also outlined.

## 2024-04-02 ENCOUNTER — APPOINTMENT (OUTPATIENT)
Dept: ORTHOPEDIC SURGERY | Facility: CLINIC | Age: 74
End: 2024-04-02

## 2024-04-15 ENCOUNTER — APPOINTMENT (OUTPATIENT)
Dept: ORTHOPEDIC SURGERY | Facility: CLINIC | Age: 74
End: 2024-04-15

## 2024-04-30 RX ORDER — TELMISARTAN AND HYDROCHLOROTHIAZIDE 80; 25 MG/1; MG/1
80-25 TABLET ORAL
Qty: 90 | Refills: 1 | Status: ACTIVE | COMMUNITY
Start: 2019-06-24 | End: 1900-01-01

## 2024-07-23 NOTE — HISTORY OF PRESENT ILLNESS
[FreeTextEntry1] : Mr. 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 His chief complaint is SOB.\par -he reports feeling generally well\par -he notes having occasional right chest pain\par -he states he has myalgias and arthralgias\par -he notes he isn't sleeping well (only 4 hours nightly)\par -he notes he has a slight cough when he gets nervous\par -he reports he becomes SOB occasionally when walking up stairs and inclines\par -When exposed to dust, he feels as though he cant breathe\par -he notes he wishes to lose weight\par -he reports he will be having another sleep study (picking up the machine later today)\par -he reports he didn't use his inhaler\par -he denies any wheezing, chest pressure, diarrhea, constipation, dysphagia, dizziness, sour taste in the mouth, heartburn, reflux Handoff

## 2024-08-21 ENCOUNTER — LABORATORY RESULT (OUTPATIENT)
Age: 74
End: 2024-08-21

## 2024-08-21 ENCOUNTER — APPOINTMENT (OUTPATIENT)
Dept: CARDIOLOGY | Facility: CLINIC | Age: 74
End: 2024-08-21
Payer: MEDICARE

## 2024-08-21 VITALS
TEMPERATURE: 97.7 F | BODY MASS INDEX: 26.22 KG/M2 | HEART RATE: 57 BPM | SYSTOLIC BLOOD PRESSURE: 127 MMHG | HEIGHT: 69 IN | WEIGHT: 177 LBS | RESPIRATION RATE: 16 BRPM | OXYGEN SATURATION: 99 % | DIASTOLIC BLOOD PRESSURE: 80 MMHG

## 2024-08-21 DIAGNOSIS — R10.13 EPIGASTRIC PAIN: ICD-10-CM

## 2024-08-21 DIAGNOSIS — R06.09 OTHER FORMS OF DYSPNEA: ICD-10-CM

## 2024-08-21 DIAGNOSIS — I34.1 NONRHEUMATIC MITRAL (VALVE) PROLAPSE: ICD-10-CM

## 2024-08-21 DIAGNOSIS — I10 ESSENTIAL (PRIMARY) HYPERTENSION: ICD-10-CM

## 2024-08-21 DIAGNOSIS — I34.0 NONRHEUMATIC MITRAL (VALVE) INSUFFICIENCY: ICD-10-CM

## 2024-08-21 DIAGNOSIS — Z78.9 OTHER SPECIFIED HEALTH STATUS: ICD-10-CM

## 2024-08-21 DIAGNOSIS — E78.5 HYPERLIPIDEMIA, UNSPECIFIED: ICD-10-CM

## 2024-08-21 PROCEDURE — G2211 COMPLEX E/M VISIT ADD ON: CPT

## 2024-08-21 PROCEDURE — 99213 OFFICE O/P EST LOW 20 MIN: CPT | Mod: 25

## 2024-08-21 PROCEDURE — 93015 CV STRESS TEST SUPVJ I&R: CPT

## 2024-08-21 RX ORDER — PANTOPRAZOLE 40 MG/1
40 TABLET, DELAYED RELEASE ORAL DAILY
Qty: 90 | Refills: 1 | Status: ACTIVE | COMMUNITY
Start: 2024-08-21 | End: 1900-01-01

## 2024-08-21 NOTE — CARDIOLOGY SUMMARY
[___] : [unfilled] [LVEF ___%] : LVEF [unfilled]% [de-identified] : 4/8/22 [de-identified] : 2019 [de-identified] : 11/30/2020

## 2024-08-21 NOTE — DISCUSSION/SUMMARY
[FreeTextEntry1] : This is a 74-year-old male with past medical history significant for anxiety, hypertension, status post bilateral hernia repair, status post bilateral varicose vein surgery, who comes in for cardiac follow up evaluation. He denies chest pain, shortness breath, dizziness or syncope.   The patient had normal exercise stress test August 21, 2024. He will continue on his current diet and exercise program.  You have new blood work done today for lipid panel electrolytes.  He will start on pantoprazole 40 mg after dinner for symptoms of dyspepsia. The patient is had  right carpal tunnel surgery April 2, 2024. Echo Doppler examination done April 8, 2022 demonstrated bileaflet mitral valve prolapse with mild to moderate mitral valve regurgitation, mild concentric left ventricular hypertrophy, mild tricuspid valve regurgitation, minimal pulmonic valve regurgitation and satisfactory left ventricular function with estimated ejection fraction 66%. The patient's blood pressure is under good control.  He will continue his current medication including Micardis hydrochlorothiazide 80/25 mg/day, Bystolic 10 mg, and Zetia therapy. Echo Doppler examination done August 7, 2019 which demonstrated mild mitral, tricuspid, and pulmonic valve regurgitation with mitral valve prolapse and thickened aortic valve leaflets.  His estimated ejection fraction is 65%. A lipid panel done June 24, 2019 demonstrated direct LDL cholesterol 147 mg/dL, hemoglobin A1c of 5.6, Restoril 208 mg/dL, triglycerides 34 mg/dL, and HDL direct 69 mg/dL. Electrocardiogram done Hillary 10, 2019 demonstrated normal sinus rhythm at 68 beats per minute is otherwise remarkable for poor R-wave progression. The patient had a normal exercise stress test August 6, 2019.  He has no history of rheumatic fever.  He does not drink excessive caffeine or alcohol.  He has no known allergies. The patient understands that aerobic exercises must be increased to 40 minutes 4 times per week. A detailed discussion of lifestyle modification was done today. The patient has a good understanding of the diagnosis, and treatment plan. Lifestyle modification was also outlined.

## 2024-08-21 NOTE — ASSESSMENT
[FreeTextEntry1] : Prior note nurse practitioner Nini May 2, 2023::  This is a 72 year year old male here today for follow up cardiac evaluation.  He has a past medical history significant for anxiety, hypertension, status post bilateral hernia repair, status post bilateral varicose vein surgery.  Today he is feeling generally well from a cardiac standpoint.  He was supposed to be here for an exercise stress test however states he hurt his shoulder while caring for his elderly mother and would like to push off the stress test until his next follow-up appointment.  He follows with his pulmonologist Dr. Duran for management of his Asthma and is so far feeling well today.    He is s/p prostate biopsy scheduled for November 17, 2022 by Dr. Holland Galvez.  At [facility].  The fax number is [phone] he states that all went well and he is feeling well from a urologic standpoint  He is currently prescribed Bystolic 10 mg daily, ezetimibe 10 mg daily and telmisartan hydrochlorothiazide 80-25 mg daily for blood pressure management.  -Cardiac risk factors include HTN and HLD.  BLOOD PRESSURE: -BP is well controlled in today's visit.  BLOOD WORK: -New blood work was done 11/02/2022  to evaluate lipid profile, CBC, BMP, hepatic function, A1C and TSH.  -New blood work was done 4/8/22 demonstrated values WDL.  TESTING: -EKG done 11/02/2022 which demonstrated regular sinus rhythm with nonspecific ST-T wave changes BPM of 56.  -Echocardiogram done in 4/8/22 demonstrated EF of 70% with normal left ventricular systolic function.  Bileaflet mitral valve prolapse with mild to moderate mitral regurgitation, mild concentric left ventricular hypertrophy, mild tricuspid and pulmonic regurgitation.  -EKG done 04/08/2022 which demonstrated regular sinus rhythm with nonspecific ST-T wave changes BPM of 63.  -EKG done 10/15/2021 which demonstrated regular sinus rhythm with nonspecific ST-T wave changes, BPM of 51.  -Echocardiogram done on 11/30/2020 demonstrated mild-moderate MR, mild tricuspid and pulmonic regurgitation, thickened aortic valve, thickened mitral valve leaflets and bileaflet MVP with normal left ventricular systolic function. EF of 65%.  PLAN: -The patient is clinically stable from a cardiac standpoint on today's exam.  -He will continue with his usual medications and will contact the office if he is having any complaints between now and their next follow up appointment. -The patient will schedule an Exercise Stress Test rule out significant coronary artery disease.  I have discussed the plan of care with Mr. TERI TADEO  and he  will follow up in 4-6 months. He is compliant with all of his  medications.  The patient understands that aerobic exercises must be increased to minutes 4 times/week and a detailed discussion of lifestyle modification was done today.  The patient has a good understanding of the diagnosis, treatment plan and lifestyle modification.  He will contact me at the office for any questions with their care or any changes in their health status.  The plan of care was discussed with supervising physician, Dr. Pearson while present in the office at the time of the visit.   Adriana DUFFY

## 2024-08-21 NOTE — REASON FOR VISIT
[CV Risk Factors and Non-Cardiac Disease] : CV risk factors and non-cardiac disease [Follow-Up - Clinic] : a clinic follow-up of [Dyspnea] : dyspnea [Hyperlipidemia] : hyperlipidemia [Hypertension] : hypertension [FreeTextEntry1] : murmur

## 2025-01-14 ENCOUNTER — LABORATORY RESULT (OUTPATIENT)
Age: 75
End: 2025-01-14

## 2025-01-14 ENCOUNTER — NON-APPOINTMENT (OUTPATIENT)
Age: 75
End: 2025-01-14

## 2025-01-14 ENCOUNTER — APPOINTMENT (OUTPATIENT)
Dept: CARDIOLOGY | Facility: CLINIC | Age: 75
End: 2025-01-14
Payer: MEDICARE

## 2025-01-14 VITALS
HEIGHT: 69 IN | HEART RATE: 61 BPM | WEIGHT: 180 LBS | OXYGEN SATURATION: 98 % | TEMPERATURE: 98.3 F | BODY MASS INDEX: 26.66 KG/M2 | RESPIRATION RATE: 16 BRPM | SYSTOLIC BLOOD PRESSURE: 126 MMHG | DIASTOLIC BLOOD PRESSURE: 79 MMHG

## 2025-01-14 DIAGNOSIS — I65.29 OCCLUSION AND STENOSIS OF UNSPECIFIED CAROTID ARTERY: ICD-10-CM

## 2025-01-14 DIAGNOSIS — I10 ESSENTIAL (PRIMARY) HYPERTENSION: ICD-10-CM

## 2025-01-14 DIAGNOSIS — Z78.9 OTHER SPECIFIED HEALTH STATUS: ICD-10-CM

## 2025-01-14 DIAGNOSIS — I34.0 NONRHEUMATIC MITRAL (VALVE) INSUFFICIENCY: ICD-10-CM

## 2025-01-14 DIAGNOSIS — E78.5 HYPERLIPIDEMIA, UNSPECIFIED: ICD-10-CM

## 2025-01-14 DIAGNOSIS — I34.1 NONRHEUMATIC MITRAL (VALVE) PROLAPSE: ICD-10-CM

## 2025-01-14 PROCEDURE — G2211 COMPLEX E/M VISIT ADD ON: CPT

## 2025-01-14 PROCEDURE — 99214 OFFICE O/P EST MOD 30 MIN: CPT

## 2025-01-14 PROCEDURE — 93000 ELECTROCARDIOGRAM COMPLETE: CPT

## 2025-03-05 ENCOUNTER — APPOINTMENT (OUTPATIENT)
Dept: VASCULAR SURGERY | Facility: CLINIC | Age: 75
End: 2025-03-05
Payer: MEDICARE

## 2025-03-05 VITALS
BODY MASS INDEX: 26.66 KG/M2 | SYSTOLIC BLOOD PRESSURE: 127 MMHG | TEMPERATURE: 97.5 F | HEART RATE: 62 BPM | WEIGHT: 180 LBS | HEIGHT: 69 IN | DIASTOLIC BLOOD PRESSURE: 78 MMHG

## 2025-03-05 DIAGNOSIS — I87.2 VENOUS INSUFFICIENCY (CHRONIC) (PERIPHERAL): ICD-10-CM

## 2025-03-05 DIAGNOSIS — I83.893 VARICOSE VEINS OF BILATERAL LOWER EXTREMITIES WITH OTHER COMPLICATIONS: ICD-10-CM

## 2025-03-05 PROCEDURE — 99214 OFFICE O/P EST MOD 30 MIN: CPT

## 2025-03-05 PROCEDURE — 93970 EXTREMITY STUDY: CPT

## 2025-03-05 PROCEDURE — 93979 VASCULAR STUDY: CPT

## 2025-08-06 ENCOUNTER — APPOINTMENT (OUTPATIENT)
Dept: CARDIOLOGY | Facility: CLINIC | Age: 75
End: 2025-08-06